# Patient Record
Sex: FEMALE | Race: WHITE | NOT HISPANIC OR LATINO | Employment: FULL TIME | URBAN - METROPOLITAN AREA
[De-identification: names, ages, dates, MRNs, and addresses within clinical notes are randomized per-mention and may not be internally consistent; named-entity substitution may affect disease eponyms.]

---

## 2017-01-12 ENCOUNTER — COMMUNICATION - HEALTHEAST (OUTPATIENT)
Dept: CARDIOLOGY | Facility: CLINIC | Age: 52
End: 2017-01-12

## 2017-01-12 DIAGNOSIS — E78.5 HYPERLIPIDEMIA: ICD-10-CM

## 2017-01-17 ENCOUNTER — COMMUNICATION - HEALTHEAST (OUTPATIENT)
Dept: CARDIOLOGY | Facility: CLINIC | Age: 52
End: 2017-01-17

## 2017-01-17 DIAGNOSIS — E78.5 HYPERLIPIDEMIA: ICD-10-CM

## 2017-02-13 ENCOUNTER — COMMUNICATION - HEALTHEAST (OUTPATIENT)
Dept: FAMILY MEDICINE | Facility: CLINIC | Age: 52
End: 2017-02-13

## 2017-02-13 DIAGNOSIS — E78.5 HYPERLIPIDEMIA: ICD-10-CM

## 2017-02-14 ENCOUNTER — COMMUNICATION - HEALTHEAST (OUTPATIENT)
Dept: CARDIOLOGY | Facility: CLINIC | Age: 52
End: 2017-02-14

## 2017-02-14 DIAGNOSIS — E78.5 HYPERLIPIDEMIA: ICD-10-CM

## 2017-03-02 ENCOUNTER — OFFICE VISIT - HEALTHEAST (OUTPATIENT)
Dept: CARDIOLOGY | Facility: CLINIC | Age: 52
End: 2017-03-02

## 2017-03-02 DIAGNOSIS — E66.3 OVERWEIGHT (BMI 25.0-29.9): ICD-10-CM

## 2017-03-02 DIAGNOSIS — E78.5 HYPERLIPIDEMIA WITH TARGET LDL LESS THAN 70: ICD-10-CM

## 2017-03-02 DIAGNOSIS — E78.5 HYPERLIPIDEMIA: ICD-10-CM

## 2017-03-02 DIAGNOSIS — I25.10 CORONARY ARTERY DISEASE INVOLVING NATIVE CORONARY ARTERY OF NATIVE HEART WITHOUT ANGINA PECTORIS: ICD-10-CM

## 2017-03-02 ASSESSMENT — MIFFLIN-ST. JEOR: SCORE: 1448.06

## 2017-04-11 ENCOUNTER — RECORDS - HEALTHEAST (OUTPATIENT)
Dept: ADMINISTRATIVE | Facility: OTHER | Age: 52
End: 2017-04-11

## 2017-07-14 ENCOUNTER — OFFICE VISIT - HEALTHEAST (OUTPATIENT)
Dept: FAMILY MEDICINE | Facility: CLINIC | Age: 52
End: 2017-07-14

## 2017-07-14 DIAGNOSIS — B37.0 THRUSH: ICD-10-CM

## 2017-07-14 DIAGNOSIS — J02.9 SORE THROAT: ICD-10-CM

## 2017-07-14 ASSESSMENT — MIFFLIN-ST. JEOR: SCORE: 1459.29

## 2017-07-19 ENCOUNTER — COMMUNICATION - HEALTHEAST (OUTPATIENT)
Dept: FAMILY MEDICINE | Facility: CLINIC | Age: 52
End: 2017-07-19

## 2017-07-28 ENCOUNTER — OFFICE VISIT - HEALTHEAST (OUTPATIENT)
Dept: FAMILY MEDICINE | Facility: CLINIC | Age: 52
End: 2017-07-28

## 2017-07-28 DIAGNOSIS — B37.0 THRUSH: ICD-10-CM

## 2017-07-28 DIAGNOSIS — E78.5 HYPERLIPIDEMIA WITH TARGET LDL LESS THAN 70: ICD-10-CM

## 2017-07-28 LAB
CHOLEST SERPL-MCNC: 151 MG/DL
FASTING STATUS PATIENT QL REPORTED: NORMAL
HBA1C MFR BLD: 5.4 % (ref 3.5–6)
HDLC SERPL-MCNC: 68 MG/DL
HIV 1+2 AB+HIV1 P24 AG SERPL QL IA: NEGATIVE
LDLC SERPL CALC-MCNC: 73 MG/DL
TRIGL SERPL-MCNC: 48 MG/DL

## 2017-07-28 ASSESSMENT — MIFFLIN-ST. JEOR: SCORE: 1451.8

## 2017-07-31 ENCOUNTER — COMMUNICATION - HEALTHEAST (OUTPATIENT)
Dept: FAMILY MEDICINE | Facility: CLINIC | Age: 52
End: 2017-07-31

## 2017-07-31 ENCOUNTER — HOSPITAL ENCOUNTER (OUTPATIENT)
Dept: MAMMOGRAPHY | Facility: CLINIC | Age: 52
Discharge: HOME | End: 2017-07-31
Attending: FAMILY MEDICINE

## 2017-07-31 DIAGNOSIS — Z12.31 VISIT FOR SCREENING MAMMOGRAM: ICD-10-CM

## 2017-08-01 ENCOUNTER — AMBULATORY - HEALTHEAST (OUTPATIENT)
Dept: FAMILY MEDICINE | Facility: CLINIC | Age: 52
End: 2017-08-01

## 2017-08-01 DIAGNOSIS — Z00.00 HEALTH CARE MAINTENANCE: ICD-10-CM

## 2017-08-01 DIAGNOSIS — D72.829 LEUKOCYTOSIS, UNSPECIFIED TYPE: ICD-10-CM

## 2017-08-04 ENCOUNTER — COMMUNICATION - HEALTHEAST (OUTPATIENT)
Dept: FAMILY MEDICINE | Facility: CLINIC | Age: 52
End: 2017-08-04

## 2017-08-16 ENCOUNTER — AMBULATORY - HEALTHEAST (OUTPATIENT)
Dept: NURSING | Facility: CLINIC | Age: 52
End: 2017-08-16

## 2017-08-16 ENCOUNTER — OFFICE VISIT - HEALTHEAST (OUTPATIENT)
Dept: OTOLARYNGOLOGY | Facility: CLINIC | Age: 52
End: 2017-08-16

## 2017-08-16 DIAGNOSIS — D72.829 LEUKOCYTOSIS, UNSPECIFIED TYPE: ICD-10-CM

## 2017-08-16 DIAGNOSIS — K14.6 TONGUE BURNING SENSATION: ICD-10-CM

## 2018-02-26 ENCOUNTER — RECORDS - HEALTHEAST (OUTPATIENT)
Dept: ADMINISTRATIVE | Facility: OTHER | Age: 53
End: 2018-02-26

## 2018-02-28 ENCOUNTER — RECORDS - HEALTHEAST (OUTPATIENT)
Dept: ADMINISTRATIVE | Facility: OTHER | Age: 53
End: 2018-02-28

## 2018-03-12 ENCOUNTER — COMMUNICATION - HEALTHEAST (OUTPATIENT)
Dept: CARDIOLOGY | Facility: CLINIC | Age: 53
End: 2018-03-12

## 2018-03-12 DIAGNOSIS — E78.5 HYPERLIPIDEMIA: ICD-10-CM

## 2018-03-13 ENCOUNTER — COMMUNICATION - HEALTHEAST (OUTPATIENT)
Dept: CARDIOLOGY | Facility: CLINIC | Age: 53
End: 2018-03-13

## 2018-03-13 DIAGNOSIS — E78.5 HYPERLIPIDEMIA: ICD-10-CM

## 2018-07-24 ENCOUNTER — OFFICE VISIT - HEALTHEAST (OUTPATIENT)
Dept: FAMILY MEDICINE | Facility: CLINIC | Age: 53
End: 2018-07-24

## 2018-07-24 DIAGNOSIS — Z00.00 ROUTINE GENERAL MEDICAL EXAMINATION AT A HEALTH CARE FACILITY: ICD-10-CM

## 2018-07-24 DIAGNOSIS — R82.998 DARK URINE: ICD-10-CM

## 2018-07-24 DIAGNOSIS — I25.10 CORONARY ARTERY DISEASE INVOLVING NATIVE CORONARY ARTERY OF NATIVE HEART WITHOUT ANGINA PECTORIS: ICD-10-CM

## 2018-07-24 DIAGNOSIS — E78.5 HYPERLIPIDEMIA WITH TARGET LDL LESS THAN 70: ICD-10-CM

## 2018-07-24 DIAGNOSIS — Z79.899 MEDICATION MANAGEMENT: ICD-10-CM

## 2018-07-24 DIAGNOSIS — E66.9 OBESITY: ICD-10-CM

## 2018-07-24 LAB
ALBUMIN SERPL-MCNC: 3.9 G/DL (ref 3.5–5)
ALBUMIN UR-MCNC: NEGATIVE MG/DL
ALP SERPL-CCNC: 62 U/L (ref 45–120)
ALT SERPL W P-5'-P-CCNC: 12 U/L (ref 0–45)
ANION GAP SERPL CALCULATED.3IONS-SCNC: 9 MMOL/L (ref 5–18)
APPEARANCE UR: CLEAR
AST SERPL W P-5'-P-CCNC: 16 U/L (ref 0–40)
BACTERIA #/AREA URNS HPF: ABNORMAL HPF
BILIRUB SERPL-MCNC: 0.6 MG/DL (ref 0–1)
BILIRUB UR QL STRIP: NEGATIVE
BUN SERPL-MCNC: 17 MG/DL (ref 8–22)
CALCIUM SERPL-MCNC: 9.2 MG/DL (ref 8.5–10.5)
CHLORIDE BLD-SCNC: 104 MMOL/L (ref 98–107)
CHOLEST SERPL-MCNC: 193 MG/DL
CO2 SERPL-SCNC: 23 MMOL/L (ref 22–31)
COLOR UR AUTO: YELLOW
CREAT SERPL-MCNC: 0.75 MG/DL (ref 0.6–1.1)
FASTING STATUS PATIENT QL REPORTED: YES
GFR SERPL CREATININE-BSD FRML MDRD: >60 ML/MIN/1.73M2
GLUCOSE BLD-MCNC: 125 MG/DL (ref 70–125)
GLUCOSE UR STRIP-MCNC: NEGATIVE MG/DL
HDLC SERPL-MCNC: 72 MG/DL
HGB BLD-MCNC: 13.3 G/DL (ref 12–16)
HGB UR QL STRIP: NEGATIVE
KETONES UR STRIP-MCNC: NEGATIVE MG/DL
LDLC SERPL CALC-MCNC: 98 MG/DL
LEUKOCYTE ESTERASE UR QL STRIP: ABNORMAL
NITRATE UR QL: NEGATIVE
PH UR STRIP: 6 [PH] (ref 5–8)
POTASSIUM BLD-SCNC: 4.7 MMOL/L (ref 3.5–5)
PROT SERPL-MCNC: 6.6 G/DL (ref 6–8)
RBC #/AREA URNS AUTO: ABNORMAL HPF
SODIUM SERPL-SCNC: 136 MMOL/L (ref 136–145)
SP GR UR STRIP: 1.01 (ref 1–1.03)
SQUAMOUS #/AREA URNS AUTO: ABNORMAL LPF
TRIGL SERPL-MCNC: 116 MG/DL
UROBILINOGEN UR STRIP-ACNC: ABNORMAL
WBC #/AREA URNS AUTO: ABNORMAL HPF

## 2018-07-24 ASSESSMENT — MIFFLIN-ST. JEOR: SCORE: 1472.21

## 2018-07-25 LAB
HPV SOURCE: NORMAL
HUMAN PAPILLOMA VIRUS 16 DNA: NEGATIVE
HUMAN PAPILLOMA VIRUS 18 DNA: NEGATIVE
HUMAN PAPILLOMA VIRUS FINAL DIAGNOSIS: NORMAL
HUMAN PAPILLOMA VIRUS OTHER HR: NEGATIVE
SPECIMEN DESCRIPTION: NORMAL

## 2018-07-27 LAB — BACTERIA SPEC CULT: NO GROWTH

## 2018-08-01 LAB
BKR LAB AP ABNORMAL BLEEDING: NO
BKR LAB AP BIRTH CONTROL/HORMONES: NORMAL
BKR LAB AP CERVICAL APPEARANCE: NORMAL
BKR LAB AP GYN ADEQUACY: NORMAL
BKR LAB AP GYN INTERPRETATION: NORMAL
BKR LAB AP HPV REFLEX: NORMAL
BKR LAB AP LMP: NORMAL
BKR LAB AP PATIENT STATUS: NORMAL
BKR LAB AP PREVIOUS ABNORMAL: NORMAL
BKR LAB AP PREVIOUS NORMAL: 2015
HIGH RISK?: NO
PATH REPORT.COMMENTS IMP SPEC: NORMAL
RESULT FLAG (HE HISTORICAL CONVERSION): NORMAL

## 2018-08-02 ENCOUNTER — RECORDS - HEALTHEAST (OUTPATIENT)
Dept: ADMINISTRATIVE | Facility: OTHER | Age: 53
End: 2018-08-02

## 2018-08-02 ENCOUNTER — HOSPITAL ENCOUNTER (OUTPATIENT)
Dept: MAMMOGRAPHY | Facility: CLINIC | Age: 53
Discharge: HOME | End: 2018-08-02
Attending: NURSE PRACTITIONER

## 2018-08-02 DIAGNOSIS — Z12.31 VISIT FOR SCREENING MAMMOGRAM: ICD-10-CM

## 2018-08-05 ENCOUNTER — COMMUNICATION - HEALTHEAST (OUTPATIENT)
Dept: FAMILY MEDICINE | Facility: CLINIC | Age: 53
End: 2018-08-05

## 2018-08-05 ENCOUNTER — AMBULATORY - HEALTHEAST (OUTPATIENT)
Dept: FAMILY MEDICINE | Facility: CLINIC | Age: 53
End: 2018-08-05

## 2018-08-05 DIAGNOSIS — R73.01 IMPAIRED FASTING GLUCOSE: ICD-10-CM

## 2018-08-24 ENCOUNTER — COMMUNICATION - HEALTHEAST (OUTPATIENT)
Dept: FAMILY MEDICINE | Facility: CLINIC | Age: 53
End: 2018-08-24

## 2018-08-29 ENCOUNTER — AMBULATORY - HEALTHEAST (OUTPATIENT)
Dept: FAMILY MEDICINE | Facility: CLINIC | Age: 53
End: 2018-08-29

## 2018-08-29 DIAGNOSIS — D72.829 LEUKOCYTOSIS, UNSPECIFIED TYPE: ICD-10-CM

## 2018-08-30 ENCOUNTER — AMBULATORY - HEALTHEAST (OUTPATIENT)
Dept: LAB | Facility: CLINIC | Age: 53
End: 2018-08-30

## 2018-08-30 ENCOUNTER — COMMUNICATION - HEALTHEAST (OUTPATIENT)
Dept: FAMILY MEDICINE | Facility: CLINIC | Age: 53
End: 2018-08-30

## 2018-08-30 DIAGNOSIS — D72.829 LEUKOCYTOSIS, UNSPECIFIED TYPE: ICD-10-CM

## 2018-08-30 DIAGNOSIS — R73.01 IMPAIRED FASTING GLUCOSE: ICD-10-CM

## 2018-08-30 LAB
ERYTHROCYTE [DISTWIDTH] IN BLOOD BY AUTOMATED COUNT: 11.9 % (ref 11–14.5)
HBA1C MFR BLD: 5.5 % (ref 3.5–6)
HCT VFR BLD AUTO: 41.6 % (ref 35–47)
HGB BLD-MCNC: 14.3 G/DL (ref 12–16)
MCH RBC QN AUTO: 33.6 PG (ref 27–34)
MCHC RBC AUTO-ENTMCNC: 34.3 G/DL (ref 32–36)
MCV RBC AUTO: 98 FL (ref 80–100)
PLATELET # BLD AUTO: 211 THOU/UL (ref 140–440)
PMV BLD AUTO: 8.7 FL (ref 7–10)
RBC # BLD AUTO: 4.25 MILL/UL (ref 3.8–5.4)
WBC: 7.3 THOU/UL (ref 4–11)

## 2018-08-31 ENCOUNTER — AMBULATORY - HEALTHEAST (OUTPATIENT)
Dept: FAMILY MEDICINE | Facility: CLINIC | Age: 53
End: 2018-08-31

## 2018-08-31 DIAGNOSIS — E78.5 HYPERLIPIDEMIA WITH TARGET LDL LESS THAN 70: ICD-10-CM

## 2018-09-17 ENCOUNTER — COMMUNICATION - HEALTHEAST (OUTPATIENT)
Dept: FAMILY MEDICINE | Facility: CLINIC | Age: 53
End: 2018-09-17

## 2018-09-25 ENCOUNTER — OFFICE VISIT - HEALTHEAST (OUTPATIENT)
Dept: FAMILY MEDICINE | Facility: CLINIC | Age: 53
End: 2018-09-25

## 2018-09-25 DIAGNOSIS — N92.1 MENORRHAGIA WITH IRREGULAR CYCLE: ICD-10-CM

## 2018-09-25 DIAGNOSIS — R14.0 ABDOMINAL BLOATING: ICD-10-CM

## 2018-09-25 DIAGNOSIS — K59.00 CONSTIPATION, UNSPECIFIED CONSTIPATION TYPE: ICD-10-CM

## 2018-09-25 ASSESSMENT — MIFFLIN-ST. JEOR: SCORE: 1467.68

## 2018-09-26 ENCOUNTER — HOSPITAL ENCOUNTER (OUTPATIENT)
Dept: ULTRASOUND IMAGING | Facility: CLINIC | Age: 53
Discharge: HOME | End: 2018-09-26
Attending: NURSE PRACTITIONER

## 2018-09-26 DIAGNOSIS — R14.0 ABDOMINAL BLOATING: ICD-10-CM

## 2018-09-26 DIAGNOSIS — N92.1 MENORRHAGIA WITH IRREGULAR CYCLE: ICD-10-CM

## 2018-09-27 ENCOUNTER — COMMUNICATION - HEALTHEAST (OUTPATIENT)
Dept: FAMILY MEDICINE | Facility: CLINIC | Age: 53
End: 2018-09-27

## 2018-10-17 ENCOUNTER — COMMUNICATION - HEALTHEAST (OUTPATIENT)
Dept: FAMILY MEDICINE | Facility: CLINIC | Age: 53
End: 2018-10-17

## 2018-11-29 ENCOUNTER — COMMUNICATION - HEALTHEAST (OUTPATIENT)
Dept: FAMILY MEDICINE | Facility: CLINIC | Age: 53
End: 2018-11-29

## 2018-11-29 DIAGNOSIS — E78.5 HYPERLIPIDEMIA LDL GOAL <70: ICD-10-CM

## 2019-01-16 ENCOUNTER — COMMUNICATION - HEALTHEAST (OUTPATIENT)
Dept: ADMINISTRATIVE | Facility: CLINIC | Age: 54
End: 2019-01-16

## 2019-01-30 ENCOUNTER — RECORDS - HEALTHEAST (OUTPATIENT)
Dept: ADMINISTRATIVE | Facility: OTHER | Age: 54
End: 2019-01-30

## 2019-02-28 ENCOUNTER — RECORDS - HEALTHEAST (OUTPATIENT)
Dept: ADMINISTRATIVE | Facility: OTHER | Age: 54
End: 2019-02-28

## 2019-04-05 ENCOUNTER — AMBULATORY - HEALTHEAST (OUTPATIENT)
Dept: LAB | Facility: CLINIC | Age: 54
End: 2019-04-05

## 2019-04-05 DIAGNOSIS — E78.5 HYPERLIPIDEMIA WITH TARGET LDL LESS THAN 70: ICD-10-CM

## 2019-04-05 LAB
CHOLEST SERPL-MCNC: 155 MG/DL
FASTING STATUS PATIENT QL REPORTED: YES
HDLC SERPL-MCNC: 79 MG/DL
LDLC SERPL CALC-MCNC: 63 MG/DL
TRIGL SERPL-MCNC: 66 MG/DL

## 2019-04-07 ENCOUNTER — COMMUNICATION - HEALTHEAST (OUTPATIENT)
Dept: FAMILY MEDICINE | Facility: CLINIC | Age: 54
End: 2019-04-07

## 2019-04-16 ENCOUNTER — OFFICE VISIT - HEALTHEAST (OUTPATIENT)
Dept: FAMILY MEDICINE | Facility: CLINIC | Age: 54
End: 2019-04-16

## 2019-04-16 ENCOUNTER — AMBULATORY - HEALTHEAST (OUTPATIENT)
Dept: FAMILY MEDICINE | Facility: CLINIC | Age: 54
End: 2019-04-16

## 2019-04-16 ENCOUNTER — COMMUNICATION - HEALTHEAST (OUTPATIENT)
Dept: FAMILY MEDICINE | Facility: CLINIC | Age: 54
End: 2019-04-16

## 2019-04-16 DIAGNOSIS — R19.5 ABNORMAL STOOL COLOR: ICD-10-CM

## 2019-04-16 DIAGNOSIS — R10.84 ABDOMINAL PAIN, GENERALIZED: ICD-10-CM

## 2019-04-16 DIAGNOSIS — I73.00 RAYNAUD'S DISEASE WITHOUT GANGRENE: ICD-10-CM

## 2019-04-16 LAB
ALBUMIN SERPL-MCNC: 4.2 G/DL (ref 3.5–5)
ALBUMIN UR-MCNC: NEGATIVE MG/DL
ALP SERPL-CCNC: 71 U/L (ref 45–120)
ALT SERPL W P-5'-P-CCNC: 15 U/L (ref 0–45)
ANION GAP SERPL CALCULATED.3IONS-SCNC: 10 MMOL/L (ref 5–18)
APPEARANCE UR: CLEAR
AST SERPL W P-5'-P-CCNC: 19 U/L (ref 0–40)
BILIRUB SERPL-MCNC: 0.5 MG/DL (ref 0–1)
BILIRUB UR QL STRIP: NEGATIVE
BUN SERPL-MCNC: 13 MG/DL (ref 8–22)
CALCIUM SERPL-MCNC: 9.7 MG/DL (ref 8.5–10.5)
CHLORIDE BLD-SCNC: 101 MMOL/L (ref 98–107)
CO2 SERPL-SCNC: 26 MMOL/L (ref 22–31)
COLOR UR AUTO: YELLOW
CREAT SERPL-MCNC: 0.73 MG/DL (ref 0.6–1.1)
ERYTHROCYTE [DISTWIDTH] IN BLOOD BY AUTOMATED COUNT: 11.7 % (ref 11–14.5)
GFR SERPL CREATININE-BSD FRML MDRD: >60 ML/MIN/1.73M2
GLUCOSE BLD-MCNC: 85 MG/DL (ref 70–125)
GLUCOSE UR STRIP-MCNC: NEGATIVE MG/DL
HCT VFR BLD AUTO: 41.7 % (ref 35–47)
HGB BLD-MCNC: 13.9 G/DL (ref 12–16)
HGB UR QL STRIP: NEGATIVE
KETONES UR STRIP-MCNC: NEGATIVE MG/DL
LEUKOCYTE ESTERASE UR QL STRIP: NEGATIVE
LIPASE SERPL-CCNC: 43 U/L (ref 0–52)
MCH RBC QN AUTO: 33.3 PG (ref 27–34)
MCHC RBC AUTO-ENTMCNC: 33.4 G/DL (ref 32–36)
MCV RBC AUTO: 100 FL (ref 80–100)
NITRATE UR QL: NEGATIVE
PH UR STRIP: 6 [PH] (ref 5–8)
PLATELET # BLD AUTO: 258 THOU/UL (ref 140–440)
PMV BLD AUTO: 8.6 FL (ref 7–10)
POTASSIUM BLD-SCNC: 4.9 MMOL/L (ref 3.5–5)
PROT SERPL-MCNC: 7 G/DL (ref 6–8)
RBC # BLD AUTO: 4.19 MILL/UL (ref 3.8–5.4)
SODIUM SERPL-SCNC: 137 MMOL/L (ref 136–145)
SP GR UR STRIP: <=1.005 (ref 1–1.03)
UROBILINOGEN UR STRIP-ACNC: NORMAL
WBC: 8.1 THOU/UL (ref 4–11)

## 2019-04-16 ASSESSMENT — MIFFLIN-ST. JEOR: SCORE: 1447.72

## 2019-04-17 ENCOUNTER — COMMUNICATION - HEALTHEAST (OUTPATIENT)
Dept: FAMILY MEDICINE | Facility: CLINIC | Age: 54
End: 2019-04-17

## 2019-04-18 ENCOUNTER — AMBULATORY - HEALTHEAST (OUTPATIENT)
Dept: FAMILY MEDICINE | Facility: CLINIC | Age: 54
End: 2019-04-18

## 2019-04-18 ENCOUNTER — HOSPITAL ENCOUNTER (OUTPATIENT)
Dept: ULTRASOUND IMAGING | Facility: CLINIC | Age: 54
Discharge: HOME | End: 2019-04-18
Attending: NURSE PRACTITIONER

## 2019-04-18 ENCOUNTER — COMMUNICATION - HEALTHEAST (OUTPATIENT)
Dept: FAMILY MEDICINE | Facility: CLINIC | Age: 54
End: 2019-04-18

## 2019-04-18 DIAGNOSIS — R10.84 ABDOMINAL PAIN, GENERALIZED: ICD-10-CM

## 2019-04-18 DIAGNOSIS — R19.5 ABNORMAL STOOL COLOR: ICD-10-CM

## 2019-04-29 ENCOUNTER — RECORDS - HEALTHEAST (OUTPATIENT)
Dept: ADMINISTRATIVE | Facility: OTHER | Age: 54
End: 2019-04-29

## 2019-05-02 ENCOUNTER — RECORDS - HEALTHEAST (OUTPATIENT)
Dept: ADMINISTRATIVE | Facility: OTHER | Age: 54
End: 2019-05-02

## 2019-05-02 ENCOUNTER — HOSPITAL ENCOUNTER (OUTPATIENT)
Dept: RADIOLOGY | Facility: CLINIC | Age: 54
Discharge: HOME | End: 2019-05-02

## 2019-05-02 DIAGNOSIS — K59.00 CONSTIPATION, UNSPECIFIED CONSTIPATION TYPE: ICD-10-CM

## 2019-06-03 ENCOUNTER — RECORDS - HEALTHEAST (OUTPATIENT)
Dept: ADMINISTRATIVE | Facility: OTHER | Age: 54
End: 2019-06-03

## 2019-09-30 ENCOUNTER — OFFICE VISIT - HEALTHEAST (OUTPATIENT)
Dept: CARDIOLOGY | Facility: CLINIC | Age: 54
End: 2019-09-30

## 2019-09-30 ENCOUNTER — COMMUNICATION - HEALTHEAST (OUTPATIENT)
Dept: CARDIOLOGY | Facility: CLINIC | Age: 54
End: 2019-09-30

## 2019-09-30 DIAGNOSIS — E78.5 HYPERLIPIDEMIA WITH TARGET LDL LESS THAN 70: ICD-10-CM

## 2019-09-30 DIAGNOSIS — I25.10 CORONARY ARTERY DISEASE INVOLVING NATIVE CORONARY ARTERY OF NATIVE HEART WITHOUT ANGINA PECTORIS: ICD-10-CM

## 2019-09-30 DIAGNOSIS — E66.3 OVERWEIGHT (BMI 25.0-29.9): ICD-10-CM

## 2019-09-30 DIAGNOSIS — E78.5 HYPERLIPIDEMIA LDL GOAL <70: ICD-10-CM

## 2019-09-30 ASSESSMENT — MIFFLIN-ST. JEOR: SCORE: 1476.52

## 2019-10-07 ENCOUNTER — HOSPITAL ENCOUNTER (OUTPATIENT)
Dept: MAMMOGRAPHY | Facility: CLINIC | Age: 54
Discharge: HOME | End: 2019-10-07
Attending: NURSE PRACTITIONER

## 2019-10-07 DIAGNOSIS — Z12.31 VISIT FOR SCREENING MAMMOGRAM: ICD-10-CM

## 2019-12-09 ENCOUNTER — COMMUNICATION - HEALTHEAST (OUTPATIENT)
Dept: FAMILY MEDICINE | Facility: CLINIC | Age: 54
End: 2019-12-09

## 2019-12-09 DIAGNOSIS — E78.5 HYPERLIPIDEMIA LDL GOAL <70: ICD-10-CM

## 2019-12-20 ENCOUNTER — RECORDS - HEALTHEAST (OUTPATIENT)
Dept: ADMINISTRATIVE | Facility: OTHER | Age: 54
End: 2019-12-20

## 2020-02-26 ENCOUNTER — RECORDS - HEALTHEAST (OUTPATIENT)
Dept: ADMINISTRATIVE | Facility: OTHER | Age: 55
End: 2020-02-26

## 2020-03-04 ENCOUNTER — COMMUNICATION - HEALTHEAST (OUTPATIENT)
Dept: FAMILY MEDICINE | Facility: CLINIC | Age: 55
End: 2020-03-04

## 2020-03-04 DIAGNOSIS — E78.5 HYPERLIPIDEMIA LDL GOAL <70: ICD-10-CM

## 2020-04-06 ENCOUNTER — OFFICE VISIT - HEALTHEAST (OUTPATIENT)
Dept: FAMILY MEDICINE | Facility: CLINIC | Age: 55
End: 2020-04-06

## 2020-04-06 DIAGNOSIS — H00.015 HORDEOLUM EXTERNUM OF LEFT LOWER EYELID: ICD-10-CM

## 2020-05-19 ENCOUNTER — OFFICE VISIT - HEALTHEAST (OUTPATIENT)
Dept: FAMILY MEDICINE | Facility: CLINIC | Age: 55
End: 2020-05-19

## 2020-05-19 DIAGNOSIS — M25.50 POLYARTHRALGIA: ICD-10-CM

## 2020-05-19 DIAGNOSIS — E78.5 HYPERLIPIDEMIA WITH TARGET LDL LESS THAN 70: ICD-10-CM

## 2020-05-19 DIAGNOSIS — Z79.899 MEDICATION MANAGEMENT: ICD-10-CM

## 2020-05-19 DIAGNOSIS — R59.1 LYMPHADENOPATHY: ICD-10-CM

## 2020-05-19 DIAGNOSIS — I25.10 CORONARY ARTERY DISEASE INVOLVING NATIVE CORONARY ARTERY OF NATIVE HEART WITHOUT ANGINA PECTORIS: ICD-10-CM

## 2020-05-20 ENCOUNTER — AMBULATORY - HEALTHEAST (OUTPATIENT)
Dept: LAB | Facility: CLINIC | Age: 55
End: 2020-05-20

## 2020-05-20 DIAGNOSIS — E78.5 HYPERLIPIDEMIA WITH TARGET LDL LESS THAN 70: ICD-10-CM

## 2020-05-20 DIAGNOSIS — R59.1 LYMPHADENOPATHY: ICD-10-CM

## 2020-05-20 DIAGNOSIS — M25.50 POLYARTHRALGIA: ICD-10-CM

## 2020-05-20 DIAGNOSIS — Z79.899 MEDICATION MANAGEMENT: ICD-10-CM

## 2020-05-20 LAB
ALBUMIN SERPL-MCNC: 4.3 G/DL (ref 3.5–5)
ALP SERPL-CCNC: 87 U/L (ref 45–120)
ALT SERPL W P-5'-P-CCNC: 16 U/L (ref 0–45)
ANION GAP SERPL CALCULATED.3IONS-SCNC: 12 MMOL/L (ref 5–18)
AST SERPL W P-5'-P-CCNC: 25 U/L (ref 0–40)
BILIRUB SERPL-MCNC: 0.6 MG/DL (ref 0–1)
BUN SERPL-MCNC: 12 MG/DL (ref 8–22)
CALCIUM SERPL-MCNC: 9.7 MG/DL (ref 8.5–10.5)
CHLORIDE BLD-SCNC: 102 MMOL/L (ref 98–107)
CHOLEST SERPL-MCNC: 168 MG/DL
CO2 SERPL-SCNC: 24 MMOL/L (ref 22–31)
CREAT SERPL-MCNC: 0.74 MG/DL (ref 0.6–1.1)
ERYTHROCYTE [DISTWIDTH] IN BLOOD BY AUTOMATED COUNT: 11.7 % (ref 11–14.5)
ERYTHROCYTE [SEDIMENTATION RATE] IN BLOOD BY WESTERGREN METHOD: 3 MM/HR (ref 0–20)
FASTING STATUS PATIENT QL REPORTED: YES
GFR SERPL CREATININE-BSD FRML MDRD: >60 ML/MIN/1.73M2
GLUCOSE BLD-MCNC: 95 MG/DL (ref 70–125)
HCT VFR BLD AUTO: 42.4 % (ref 35–47)
HDLC SERPL-MCNC: 82 MG/DL
HGB BLD-MCNC: 14.5 G/DL (ref 12–16)
LDLC SERPL CALC-MCNC: 75 MG/DL
MCH RBC QN AUTO: 33.6 PG (ref 27–34)
MCHC RBC AUTO-ENTMCNC: 34.2 G/DL (ref 32–36)
MCV RBC AUTO: 98 FL (ref 80–100)
PLATELET # BLD AUTO: 177 THOU/UL (ref 140–440)
PMV BLD AUTO: 9.3 FL (ref 7–10)
POTASSIUM BLD-SCNC: 4.8 MMOL/L (ref 3.5–5)
PROT SERPL-MCNC: 7 G/DL (ref 6–8)
RBC # BLD AUTO: 4.32 MILL/UL (ref 3.8–5.4)
RHEUMATOID FACT SERPL-ACNC: <15 IU/ML (ref 0–30)
SODIUM SERPL-SCNC: 138 MMOL/L (ref 136–145)
TRIGL SERPL-MCNC: 57 MG/DL
WBC: 8.4 THOU/UL (ref 4–11)

## 2020-05-21 ENCOUNTER — HOSPITAL ENCOUNTER (OUTPATIENT)
Dept: ULTRASOUND IMAGING | Facility: CLINIC | Age: 55
Discharge: HOME | End: 2020-05-21
Attending: NURSE PRACTITIONER

## 2020-05-21 DIAGNOSIS — R59.1 LYMPHADENOPATHY: ICD-10-CM

## 2020-05-22 ENCOUNTER — AMBULATORY - HEALTHEAST (OUTPATIENT)
Dept: FAMILY MEDICINE | Facility: CLINIC | Age: 55
End: 2020-05-22

## 2020-05-22 DIAGNOSIS — E55.9 VITAMIN D DEFICIENCY: ICD-10-CM

## 2020-05-22 LAB
25(OH)D3 SERPL-MCNC: 15.8 NG/ML (ref 30–80)
25(OH)D3 SERPL-MCNC: 15.8 NG/ML (ref 30–80)
ANA SER QL: 0.1 U
B BURGDOR IGG+IGM SER QL: <0.01 INDEX VALUE

## 2020-06-02 ENCOUNTER — COMMUNICATION - HEALTHEAST (OUTPATIENT)
Dept: FAMILY MEDICINE | Facility: CLINIC | Age: 55
End: 2020-06-02

## 2020-06-02 DIAGNOSIS — E78.5 HYPERLIPIDEMIA LDL GOAL <70: ICD-10-CM

## 2020-07-08 ENCOUNTER — OFFICE VISIT - HEALTHEAST (OUTPATIENT)
Dept: RHEUMATOLOGY | Facility: CLINIC | Age: 55
End: 2020-07-08

## 2020-07-08 DIAGNOSIS — M79.604 LEG PAIN, BILATERAL: ICD-10-CM

## 2020-07-08 DIAGNOSIS — I73.00 RAYNAUD'S DISEASE WITHOUT GANGRENE: ICD-10-CM

## 2020-07-08 DIAGNOSIS — M79.605 LEG PAIN, BILATERAL: ICD-10-CM

## 2020-07-08 DIAGNOSIS — M79.10 MYALGIA: ICD-10-CM

## 2020-07-08 DIAGNOSIS — E55.9 VITAMIN D DEFICIENCY: ICD-10-CM

## 2020-07-08 DIAGNOSIS — Z78.9 ALCOHOL CONSUMPTION TWO TO FOUR DAYS PER WEEK: ICD-10-CM

## 2020-07-09 ENCOUNTER — OFFICE VISIT - HEALTHEAST (OUTPATIENT)
Dept: FAMILY MEDICINE | Facility: CLINIC | Age: 55
End: 2020-07-09

## 2020-07-09 DIAGNOSIS — E66.09 CLASS 1 OBESITY DUE TO EXCESS CALORIES WITH SERIOUS COMORBIDITY AND BODY MASS INDEX (BMI) OF 30.0 TO 30.9 IN ADULT: ICD-10-CM

## 2020-07-09 DIAGNOSIS — Z00.00 ROUTINE GENERAL MEDICAL EXAMINATION AT A HEALTH CARE FACILITY: ICD-10-CM

## 2020-07-09 DIAGNOSIS — E78.5 HYPERLIPIDEMIA WITH TARGET LDL LESS THAN 70: ICD-10-CM

## 2020-07-09 DIAGNOSIS — E55.9 VITAMIN D DEFICIENCY: ICD-10-CM

## 2020-07-09 DIAGNOSIS — E66.811 CLASS 1 OBESITY DUE TO EXCESS CALORIES WITH SERIOUS COMORBIDITY AND BODY MASS INDEX (BMI) OF 30.0 TO 30.9 IN ADULT: ICD-10-CM

## 2020-07-09 DIAGNOSIS — I25.10 CORONARY ARTERY DISEASE INVOLVING NATIVE CORONARY ARTERY OF NATIVE HEART WITHOUT ANGINA PECTORIS: ICD-10-CM

## 2020-07-09 ASSESSMENT — MIFFLIN-ST. JEOR: SCORE: 1464.05

## 2020-07-24 ENCOUNTER — AMBULATORY - HEALTHEAST (OUTPATIENT)
Dept: LAB | Facility: CLINIC | Age: 55
End: 2020-07-24

## 2020-07-24 DIAGNOSIS — E55.9 VITAMIN D DEFICIENCY: ICD-10-CM

## 2020-07-24 DIAGNOSIS — M79.605 LEG PAIN, BILATERAL: ICD-10-CM

## 2020-07-24 DIAGNOSIS — I73.00 RAYNAUD'S DISEASE WITHOUT GANGRENE: ICD-10-CM

## 2020-07-24 DIAGNOSIS — Z78.9 ALCOHOL CONSUMPTION TWO TO FOUR DAYS PER WEEK: ICD-10-CM

## 2020-07-24 DIAGNOSIS — M79.10 MYALGIA: ICD-10-CM

## 2020-07-24 DIAGNOSIS — M79.604 LEG PAIN, BILATERAL: ICD-10-CM

## 2020-07-24 LAB
25(OH)D3 SERPL-MCNC: 54.7 NG/ML (ref 30–80)
25(OH)D3 SERPL-MCNC: 54.7 NG/ML (ref 30–80)
C REACTIVE PROTEIN LHE: 0.1 MG/DL (ref 0–0.8)
CK SERPL-CCNC: 88 U/L (ref 30–190)
MAGNESIUM SERPL-MCNC: 2.3 MG/DL (ref 1.8–2.6)
TSH SERPL DL<=0.005 MIU/L-ACNC: 3.55 UIU/ML (ref 0.3–5)
URATE SERPL-MCNC: 5.5 MG/DL (ref 2–7.5)

## 2020-07-25 LAB — ANCA IGG TITR SER IF: NORMAL {TITER}

## 2020-07-29 LAB
ALBUMIN PERCENT: 65.2 % (ref 51–67)
ALBUMIN SERPL ELPH-MCNC: 4.4 G/DL (ref 3.2–4.7)
ALPHA 1 PERCENT: 2.4 % (ref 2–4)
ALPHA 2 PERCENT: 10.9 % (ref 5–13)
ALPHA1 GLOB SERPL ELPH-MCNC: 0.2 G/DL (ref 0.1–0.3)
ALPHA2 GLOB SERPL ELPH-MCNC: 0.7 G/DL (ref 0.4–0.9)
B-GLOBULIN SERPL ELPH-MCNC: 0.8 G/DL (ref 0.7–1.2)
BETA PERCENT: 12.3 % (ref 10–17)
GAMMA GLOB SERPL ELPH-MCNC: 0.6 G/DL (ref 0.6–1.4)
GAMMA GLOBULIN PERCENT: 9.2 % (ref 9–20)
PATH ICD:: NORMAL
PATH ICD:: NORMAL
PROT PATTERN SERPL ELPH-IMP: NORMAL
PROT PATTERN SERPL ELPH-IMP: NORMAL
PROT SERPL-MCNC: 6.7 G/DL (ref 6–8)
REVIEWING PATHOLOGIST: NORMAL
REVIEWING PATHOLOGIST: NORMAL
TOTAL PROTEIN RANDOM URINE MG/DL: <7 MG/DL

## 2020-07-31 LAB — CRYOGLOB SER QL: NEGATIVE %

## 2020-10-28 ENCOUNTER — OFFICE VISIT - HEALTHEAST (OUTPATIENT)
Dept: PODIATRY | Facility: CLINIC | Age: 55
End: 2020-10-28

## 2020-10-28 DIAGNOSIS — M72.2 PLANTAR FASCIITIS: ICD-10-CM

## 2020-10-28 ASSESSMENT — MIFFLIN-ST. JEOR: SCORE: 1464.05

## 2020-11-08 ENCOUNTER — COMMUNICATION - HEALTHEAST (OUTPATIENT)
Dept: PODIATRY | Facility: CLINIC | Age: 55
End: 2020-11-08

## 2020-11-08 DIAGNOSIS — M72.2 PLANTAR FASCIITIS: ICD-10-CM

## 2020-11-09 RX ORDER — IBUPROFEN 600 MG/1
TABLET, FILM COATED ORAL
Qty: 42 TABLET | Refills: 0 | Status: SHIPPED | OUTPATIENT
Start: 2020-11-09 | End: 2022-08-05

## 2020-11-26 ENCOUNTER — COMMUNICATION - HEALTHEAST (OUTPATIENT)
Dept: PODIATRY | Facility: CLINIC | Age: 55
End: 2020-11-26

## 2020-11-26 DIAGNOSIS — M72.2 PLANTAR FASCIITIS: ICD-10-CM

## 2020-12-08 ENCOUNTER — HOSPITAL ENCOUNTER (OUTPATIENT)
Dept: MAMMOGRAPHY | Facility: CLINIC | Age: 55
Discharge: HOME | End: 2020-12-08
Attending: NURSE PRACTITIONER

## 2020-12-08 DIAGNOSIS — Z12.31 VISIT FOR SCREENING MAMMOGRAM: ICD-10-CM

## 2021-01-06 ENCOUNTER — RECORDS - HEALTHEAST (OUTPATIENT)
Dept: ADMINISTRATIVE | Facility: OTHER | Age: 56
End: 2021-01-06

## 2021-01-14 ENCOUNTER — OFFICE VISIT - HEALTHEAST (OUTPATIENT)
Dept: FAMILY MEDICINE | Facility: CLINIC | Age: 56
End: 2021-01-14

## 2021-01-14 DIAGNOSIS — N30.01 ACUTE CYSTITIS WITH HEMATURIA: ICD-10-CM

## 2021-01-14 DIAGNOSIS — R30.0 DYSURIA: ICD-10-CM

## 2021-01-14 DIAGNOSIS — R20.2 PARESTHESIA: ICD-10-CM

## 2021-01-14 DIAGNOSIS — M79.641 BILATERAL HAND PAIN: ICD-10-CM

## 2021-01-14 DIAGNOSIS — M25.50 POLYARTHRALGIA: ICD-10-CM

## 2021-01-14 DIAGNOSIS — M79.642 BILATERAL HAND PAIN: ICD-10-CM

## 2021-01-14 LAB
ALBUMIN UR-MCNC: NEGATIVE MG/DL
APPEARANCE UR: ABNORMAL
BACTERIA #/AREA URNS HPF: ABNORMAL HPF
BILIRUB UR QL STRIP: NEGATIVE
CLUE CELLS: NORMAL
COLOR UR AUTO: YELLOW
GLUCOSE UR STRIP-MCNC: NEGATIVE MG/DL
HGB UR QL STRIP: ABNORMAL
KETONES UR STRIP-MCNC: NEGATIVE MG/DL
LEUKOCYTE ESTERASE UR QL STRIP: ABNORMAL
NITRATE UR QL: NEGATIVE
PH UR STRIP: 5.5 [PH] (ref 5–8)
RBC #/AREA URNS AUTO: ABNORMAL HPF
SP GR UR STRIP: 1.01 (ref 1–1.03)
SQUAMOUS #/AREA URNS AUTO: ABNORMAL LPF
TRICHOMONAS, WET PREP: NORMAL
UROBILINOGEN UR STRIP-ACNC: ABNORMAL
WBC #/AREA URNS AUTO: ABNORMAL HPF
WBC CLUMPS #/AREA URNS HPF: PRESENT /[HPF]
YEAST, WET PREP: NORMAL

## 2021-01-16 LAB — BACTERIA SPEC CULT: ABNORMAL

## 2021-01-17 ENCOUNTER — AMBULATORY - HEALTHEAST (OUTPATIENT)
Dept: FAMILY MEDICINE | Facility: CLINIC | Age: 56
End: 2021-01-17

## 2021-01-18 ENCOUNTER — AMBULATORY - HEALTHEAST (OUTPATIENT)
Dept: FAMILY MEDICINE | Facility: CLINIC | Age: 56
End: 2021-01-18

## 2021-01-18 DIAGNOSIS — N30.01 ACUTE CYSTITIS WITH HEMATURIA: ICD-10-CM

## 2021-02-02 ENCOUNTER — COMMUNICATION - HEALTHEAST (OUTPATIENT)
Dept: CARDIOLOGY | Facility: CLINIC | Age: 56
End: 2021-02-02

## 2021-03-02 ENCOUNTER — RECORDS - HEALTHEAST (OUTPATIENT)
Dept: ADMINISTRATIVE | Facility: OTHER | Age: 56
End: 2021-03-02

## 2021-05-15 ENCOUNTER — COMMUNICATION - HEALTHEAST (OUTPATIENT)
Dept: FAMILY MEDICINE | Facility: CLINIC | Age: 56
End: 2021-05-15

## 2021-05-15 DIAGNOSIS — E78.5 HYPERLIPIDEMIA LDL GOAL <70: ICD-10-CM

## 2021-05-15 RX ORDER — PRAVASTATIN SODIUM 80 MG/1
TABLET ORAL
Qty: 90 TABLET | Refills: 2 | Status: SHIPPED | OUTPATIENT
Start: 2021-05-15 | End: 2021-11-23

## 2021-05-27 NOTE — TELEPHONE ENCOUNTER
Reason contacted:  Results  Information relayed:  See message  Additional questions:  No  Further follow-up needed:  No  Okay to leave a detailed message:  No

## 2021-05-27 NOTE — TELEPHONE ENCOUNTER
Upcoming Appointment Question  When is the appointment: Today  What is your appointment for?: light colored stool, constant pain in mid section  Who is your appointment scheduled with?: PCP only  What is your question/concern?: Patient would like to come in earlier today if there is a cancellation as she plans to fast for visit.  -declined nurse triage  Okay to leave a detailed message?: Yes

## 2021-05-27 NOTE — PROGRESS NOTES
Assessment and Plan:     1. Abdominal pain, generalized  HM2(CBC w/o Differential)    Comprehensive Metabolic Panel    Lipase    Urinalysis-UC if Indicated   2. Abnormal stool color     3. Raynaud's disease without gangrene       Will check hemogram, CMP, lipase, urinalysis due to different locations of her abdominal discomfort.  Due to the white stool, we will rule out gallbladder disease, pancreatitis, pancreatic cancer, hepatitis.  Antacid consumption may be contributing.  Will order abdominal ultrasound or CT based off the results of the labs.  Discussed referral to rheumatology due to history of Raynaud's and recent dry mouth, but she declines.  She would like to evaluate her current symptoms first.  She will notify me when she wants to see rheumatology.  She is content with the plan.    Subjective:     Tali is a 53 y.o. female presenting to the clinic for multiple concerns today.  Patient states in March 2018, she developed midepigastric abdominal pain.  She had difficulty turning her body due to the pain.  She saw a chiropractor who performed a deep tissue massage.  Since then, she has had intermittent tightness within her upper abdomen and thoracic back region.  She has found no correlation of symptoms with food consumption.  No heartburn has been present.  She does have a history of constipation.  One week ago Saturday, she drank coffee.  Shortly after, she developed chills and dry mouth.  She has some abdominal discomfort and made herself vomit.  She had lack of appetite the rest the day.  This past Sunday, she noticed that her stool was white in color.  It was formed.  She denies any blood or mucus.  Patient states she has had a white stool every day since.  She has been taking some over-the-counter antacids.  She takes Krill and co-Q10 regularly.  She has been consuming more white cheese.  She is concerned as her father had pancreatic cancer and her mother had stomach cancer.  Patient is also concerned  of possible autoimmune disease.  She was diagnosed with Raynaud's multiple years ago.  Her paternal grandfather and father had Parkinson's and a cousin had MS.      Review of Systems: A complete 14 point review of systems was obtained and is negative or as stated in the history of present illness.    Social History     Socioeconomic History     Marital status:      Spouse name: Not on file     Number of children: Not on file     Years of education: Not on file     Highest education level: Not on file   Occupational History     Not on file   Social Needs     Financial resource strain: Not on file     Food insecurity:     Worry: Not on file     Inability: Not on file     Transportation needs:     Medical: Not on file     Non-medical: Not on file   Tobacco Use     Smoking status: Never Smoker     Smokeless tobacco: Never Used   Substance and Sexual Activity     Alcohol use: Yes     Alcohol/week: 6.0 oz     Types: 5 Glasses of wine, 5 Cans of beer per week     Drug use: No     Sexual activity: Not on file     Comment:     Lifestyle     Physical activity:     Days per week: Not on file     Minutes per session: Not on file     Stress: Not on file   Relationships     Social connections:     Talks on phone: Not on file     Gets together: Not on file     Attends Gnosticism service: Not on file     Active member of club or organization: Not on file     Attends meetings of clubs or organizations: Not on file     Relationship status: Not on file     Intimate partner violence:     Fear of current or ex partner: Not on file     Emotionally abused: Not on file     Physically abused: Not on file     Forced sexual activity: Not on file   Other Topics Concern     Not on file   Social History Narrative     Not on file       Active Ambulatory Problems     Diagnosis Date Noted     Vitamin D Deficiency      Limb Pain      Rosacea      Vertigo      Basal Cell Carcinoma Of The Skin      CAD (coronary artery disease), native  "coronary artery 10/29/2015     Hyperlipidemia LDL goal < 70 10/29/2015     Colon polyp 05/18/2016     Overweight (BMI 25.0-29.9) 03/02/2017     Resolved Ambulatory Problems     Diagnosis Date Noted     Obesity      Past Medical History:   Diagnosis Date     Coronary artery calcification seen on CT scan      Hyperlipidemia      Overweight (BMI 25.0-29.9)        Family History   Problem Relation Age of Onset     Colon cancer Maternal Grandfather 65     Macular degeneration Maternal Grandfather      Stomach cancer Mother 59     Coronary artery disease Father      Parkinsonism Father      Pancreatic cancer Father 59     Stroke Maternal Grandmother      Parkinsonism Paternal Grandfather      Heart attack Paternal Uncle      ALS Paternal Aunt      Multiple sclerosis Other 35     Heart disease Neg Hx        Objective:     /80   Pulse 76   Ht 5' 6\" (1.676 m)   Wt 185 lb 6.4 oz (84.1 kg)   SpO2 100%   BMI 29.92 kg/m      Patient is alert, in no obvious distress.   Skin: Warm, dry.  No lesions or rashes.  Skin turgor rapid return.   HEENT:  Head normocephalic, atraumatic.  Eyes normal. Ears normal.  Nose patent, mucosa pink.  Oropharynx mucosa pink.  No lesions or tonsillar enlargement.   Neck: Supple, no lymphadenopathy.   Lungs:  Clear to auscultation. Respirations even and unlabored.  No wheezing or rales noted.   Heart:  Regular rate and rhythm.  No murmurs, S3, S4, gallops, or rubs.    Abdomen: Soft, nontender.  No organomegaly. Bowel sounds normoactive. No guarding or masses noted.                 "

## 2021-05-27 NOTE — TELEPHONE ENCOUNTER
----- Message from Juliette Gage CMA sent at 4/18/2019  1:23 PM CDT -----      ----- Message -----  From: Cherry Mccauley CNP  Sent: 4/18/2019  12:07 PM  To: Cherry Mccauley Care Team Pool    Please notify the patient that her abdominal ultrasound is normal.  I recommend avoiding antiacids.  I placed a referral to gastroenterology for further evaluation if her symptoms persist.  Thanks.

## 2021-05-27 NOTE — TELEPHONE ENCOUNTER
----- Message from Juliette Gage CMA sent at 4/17/2019  8:13 AM CDT -----      ----- Message -----  From: Cherry Mccauley CNP  Sent: 4/16/2019   8:51 PM  To: Cherry Mccauley Care Team Pool    Please notify the patient that her labs are normal.  I placed an order for an ultrasound for further evaluation of her abdominal pain.  Thanks.

## 2021-05-28 ENCOUNTER — RECORDS - HEALTHEAST (OUTPATIENT)
Dept: ADMINISTRATIVE | Facility: CLINIC | Age: 56
End: 2021-05-28

## 2021-05-29 ENCOUNTER — RECORDS - HEALTHEAST (OUTPATIENT)
Dept: ADMINISTRATIVE | Facility: CLINIC | Age: 56
End: 2021-05-29

## 2021-05-30 ENCOUNTER — RECORDS - HEALTHEAST (OUTPATIENT)
Dept: ADMINISTRATIVE | Facility: CLINIC | Age: 56
End: 2021-05-30

## 2021-05-30 VITALS — WEIGHT: 184.6 LBS | HEIGHT: 66 IN | BODY MASS INDEX: 29.67 KG/M2

## 2021-05-31 VITALS — BODY MASS INDEX: 29.22 KG/M2 | HEIGHT: 67 IN | WEIGHT: 186.2 LBS

## 2021-05-31 VITALS — BODY MASS INDEX: 29.94 KG/M2 | HEIGHT: 66 IN | WEIGHT: 186.3 LBS

## 2021-06-01 ENCOUNTER — RECORDS - HEALTHEAST (OUTPATIENT)
Dept: ADMINISTRATIVE | Facility: CLINIC | Age: 56
End: 2021-06-01

## 2021-06-01 VITALS — WEIGHT: 190.8 LBS | HEIGHT: 66 IN | BODY MASS INDEX: 30.67 KG/M2

## 2021-06-01 NOTE — PROGRESS NOTES
"Thank you for asking the [facility] team to see Tali Kumar      Assessment/Plan:   Coronary calcification - LDL and BP controlled. Asymptomatic.    Axilla discomfort - persistent and no change with exercise. Sounds very atypical for ischemia. She will monitor it and call if it progresses or changes with exertion. We discussed stress testing but she wishes to wait at this time.    Lipids - as above    Weight gain - weight training to increase her metabolism recommended. She will consider intermittent fasting.     F/U 2 years     Current History:   Tali Kumar is a 54 y.o. with extensive family history of cad and atypical chest pain found to have coronary calcification on CT with a normal stress test. She has been on pravastatin 80mg with LDL 63 this past spring.     Tali returns for f/u today. She continues to exercise and eat a healthy diet with limited processed foods. She has gained about 7 lbs this past month and thinks it must be related to menopause. There is no chest pain but she does have a persistent underarm discomfort that does not change with position, moving her arms, or exercise. She describes it as a \"razor burn\" and also like she is trying to hold something under her axilla. It is bilateral and never involves her chest or back.     Past Medical History:     Past Medical History:   Diagnosis Date     Coronary artery calcification seen on CT scan      Hyperlipidemia      Overweight (BMI 25.0-29.9)        Past Surgical History:     Past Surgical History:   Procedure Laterality Date     BLADDER NECK RECONSTRUCTION       BUNIONECTOMY Left      AK PROBE NASOLAC DUCT,INSERT TUBE/STENT      Description: Probing Of Nasolacrimal Duct With Insertion Of Tube;  Recorded: 05/21/2012;       Family History:     Family History   Problem Relation Age of Onset     Colon cancer Maternal Grandfather 65     Macular degeneration Maternal Grandfather      Stomach cancer Mother 59     Coronary artery disease " "Father      Parkinsonism Father      Pancreatic cancer Father 59     Stroke Maternal Grandmother      Parkinsonism Paternal Grandfather      Heart attack Paternal Uncle      ALS Paternal Aunt      Multiple sclerosis Other 35     Heart disease Neg Hx        Social History:    reports that she has never smoked. She has never used smokeless tobacco. She reports current alcohol use of about 10.0 standard drinks of alcohol per week. She reports that she does not use drugs.    Meds:     Current Outpatient Medications   Medication Sig     COQ10, UBIQUINOL, ORAL Take by mouth.            KRILL OIL ORAL Take by mouth.     pravastatin (PRAVACHOL) 80 MG tablet Take 1 tablet (80 mg total) by mouth at bedtime.       Allergies:   Patient has no known allergies.    Review of Systems:   Review of Systems:   General: WNL, Weight Gain  Eyes: WNL  Ears/Nose/Throat: WNL  Lungs: WNL  Heart: WNL  Stomach: WNL  Bladder: WNL  Muscle/Joints: WNL  Skin: WNL  Nervous System: WNL  Mental Health: WNL     Blood: WNL       Objective:      Physical Exam  @LASTENCWT:3@  5' 6.5\" (1.689 m)  @BMI:3@  /80   Pulse 66   Ht 5' 6.5\" (1.689 m)   Wt 190 lb (86.2 kg)   SpO2 99%   BMI 30.21 kg/m      General Appearance:   Alert, cooperative and in no acute distress.   HEENT:  No scleral icterus; the mucous membranes were pink and moist.   Neck: JVP flat. No thyromegaly. No HJR   Chest: The spine was straight. The chest was symmetric.   Lungs:   Respirations unlabored; the lungs are clear to auscultation.   Cardiovascular:   S1 and S2 normal and without murmur. No clicks or rubs. No carotid bruits noted. Right DP, PT, and radial pulses 2+. Left DP, PT, and radial pulses 2+.   Abdomen:  No organomegaly, masses, bruits, or tenderness. Bowels sounds are present   Extremities: No cyanosis, clubbing, or edema.   Skin: No xanthelasma.   Neurologic: Mood and affect are appropriate.         Lab Review   Lab Results   Component Value Date     04/16/2019 "     07/24/2018     07/28/2017    K 4.9 04/16/2019    K 4.7 07/24/2018    K 4.5 07/28/2017     04/16/2019     07/24/2018     07/28/2017    CO2 26 04/16/2019    CO2 23 07/24/2018    CO2 28 07/28/2017    BUN 13 04/16/2019    BUN 17 07/24/2018    BUN 10 07/28/2017    CREATININE 0.73 04/16/2019    CREATININE 0.75 07/24/2018    CREATININE 0.78 07/28/2017    CALCIUM 9.7 04/16/2019    CALCIUM 9.2 07/24/2018    CALCIUM 9.7 07/28/2017     Lab Results   Component Value Date    WBC 8.1 04/16/2019    WBC 7.3 08/30/2018    WBC 10.6 08/16/2017    HGB 13.9 04/16/2019    HGB 14.3 08/30/2018    HGB 13.3 07/24/2018    HCT 41.7 04/16/2019    HCT 41.6 08/30/2018    HCT 40.0 07/28/2017     04/16/2019    MCV 98 08/30/2018     07/28/2017     04/16/2019     08/30/2018     07/28/2017     Lab Results   Component Value Date    CHOL 155 04/05/2019    CHOL 193 07/24/2018    CHOL 151 07/28/2017    TRIG 66 04/05/2019    TRIG 116 07/24/2018    TRIG 48 07/28/2017    HDL 79 04/05/2019    HDL 72 07/24/2018    HDL 68 07/28/2017     No results found for: BNP      Cyndi Chew M.D.

## 2021-06-01 NOTE — PATIENT INSTRUCTIONS - HE
- Exercise 30 minutes, start doing some weight training    - Consider intermittent fasting. Look at the website [email or web address] for this    - See me in 2 years but call if the armpit discomfort worsens with exertion

## 2021-06-02 VITALS — BODY MASS INDEX: 30.5 KG/M2 | HEIGHT: 66 IN | WEIGHT: 189.8 LBS

## 2021-06-02 VITALS — HEIGHT: 66 IN | BODY MASS INDEX: 29.8 KG/M2 | WEIGHT: 185.4 LBS

## 2021-06-03 VITALS
HEIGHT: 67 IN | OXYGEN SATURATION: 99 % | HEART RATE: 66 BPM | DIASTOLIC BLOOD PRESSURE: 80 MMHG | BODY MASS INDEX: 29.82 KG/M2 | WEIGHT: 190 LBS | SYSTOLIC BLOOD PRESSURE: 126 MMHG

## 2021-06-04 VITALS
OXYGEN SATURATION: 99 % | DIASTOLIC BLOOD PRESSURE: 76 MMHG | SYSTOLIC BLOOD PRESSURE: 126 MMHG | HEART RATE: 55 BPM | HEIGHT: 66 IN | BODY MASS INDEX: 30.37 KG/M2 | WEIGHT: 189 LBS

## 2021-06-04 NOTE — TELEPHONE ENCOUNTER
Refill Approved    Rx renewed per Medication Renewal Policy. Medication was last renewed on 9/30/19.    Chetna Wilburn, [facility] Triage/Med Refill 12/9/2019     Requested Prescriptions   Pending Prescriptions Disp Refills     pravastatin (PRAVACHOL) 80 MG tablet [Pharmacy Med Name: PRAVASTATIN 80MG TABLETS] 90 tablet 0     Sig: TAKE 1 TABLET(80 MG) BY MOUTH EVERY EVENING       Statins Refill Protocol (Hmg CoA Reductase Inhibitors) Passed - 12/9/2019  8:01 AM        Passed - PCP or prescribing provider visit in past 12 months      Last office visit with prescriber/PCP: 4/16/2019 Cherry Mccauley CNP OR same dept: 4/16/2019 Cherry Mccauley CNP OR same specialty: 4/16/2019 Cherry Mccauley CNP  Last physical: 7/24/2018 Last MTM visit: Visit date not found   Next visit within 3 mo: Visit date not found  Next physical within 3 mo: Visit date not found  Prescriber OR PCP: Cherry Mccauley CNP  Last diagnosis associated with med order: 1. Hyperlipidemia LDL goal <70  - pravastatin (PRAVACHOL) 80 MG tablet [Pharmacy Med Name: PRAVASTATIN 80MG TABLETS]; TAKE 1 TABLET(80 MG) BY MOUTH EVERY EVENING  Dispense: 90 tablet; Refill: 0    If protocol passes may refill for 12 months if within 3 months of last provider visit (or a total of 15 months).

## 2021-06-05 ENCOUNTER — RECORDS - HEALTHEAST (OUTPATIENT)
Dept: CARDIOLOGY | Facility: CLINIC | Age: 56
End: 2021-06-05

## 2021-06-05 VITALS
TEMPERATURE: 97.7 F | WEIGHT: 196 LBS | DIASTOLIC BLOOD PRESSURE: 70 MMHG | SYSTOLIC BLOOD PRESSURE: 110 MMHG | BODY MASS INDEX: 31.64 KG/M2 | HEART RATE: 60 BPM

## 2021-06-05 VITALS — HEART RATE: 77 BPM | OXYGEN SATURATION: 98 % | HEIGHT: 66 IN | WEIGHT: 189 LBS | BODY MASS INDEX: 30.37 KG/M2

## 2021-06-05 DIAGNOSIS — R07.9 CHEST PAIN: ICD-10-CM

## 2021-06-05 DIAGNOSIS — R93.89 NONSPECIFIC (ABNORMAL) FINDINGS ON RADIOLOGICAL AND OTHER EXAMINATION OF OTHER INTRATHORACIC ORGANS: ICD-10-CM

## 2021-06-06 NOTE — TELEPHONE ENCOUNTER
Refill Approved    Rx renewed per Medication Renewal Policy. Medication was last renewed on 12/9/19.    Alda Gao, [facility] Triage/Med Refill 3/4/2020     Requested Prescriptions   Pending Prescriptions Disp Refills     pravastatin (PRAVACHOL) 80 MG tablet [Pharmacy Med Name: PRAVASTATIN 80MG TABLETS] 90 tablet 0     Sig: TAKE 1 TABLET(80 MG) BY MOUTH EVERY EVENING       Statins Refill Protocol (Hmg CoA Reductase Inhibitors) Passed - 3/4/2020  8:05 AM        Passed - PCP or prescribing provider visit in past 12 months      Last office visit with prescriber/PCP: 4/16/2019 Cherry Mccauley CNP OR same dept: 4/16/2019 Cherry Mccauley CNP OR same specialty: 4/16/2019 Cherry Mccauley CNP  Last physical: 7/24/2018 Last MTM visit: Visit date not found   Next visit within 3 mo: Visit date not found  Next physical within 3 mo: Visit date not found  Prescriber OR PCP: Cherry Mccauley CNP  Last diagnosis associated with med order: 1. Hyperlipidemia LDL goal <70  - pravastatin (PRAVACHOL) 80 MG tablet [Pharmacy Med Name: PRAVASTATIN 80MG TABLETS]; TAKE 1 TABLET(80 MG) BY MOUTH EVERY EVENING  Dispense: 90 tablet; Refill: 0    If protocol passes may refill for 12 months if within 3 months of last provider visit (or a total of 15 months).

## 2021-06-07 NOTE — PROGRESS NOTES
"Tali Kumar is a 54 y.o. female who is being evaluated via a billable telephone visit.      The patient has been notified of following:     \"This telephone visit will be conducted via a call between you and your physician/provider. We have found that certain health care needs can be provided without the need for a physical exam.  This service lets us provide the care you need with a short phone conversation.  If a prescription is necessary we can send it directly to your pharmacy.  If lab work is needed we can place an order for that and you can then stop by our lab to have the test done at a later time.    If during the course of the call the physician/provider feels a telephone visit is not appropriate, you will not be charged for this service.\"     Patient has given verbal consent to a Telephone visit? Yes    Tali Kumar complains of    Chief Complaint   Patient presents with     Eye Problem     Lefe eye- lump- w/ pain and swelling        I have reviewed and updated the patient's Past Medical History, Social History, Family History and Medication List.    ALLERGIES  Patient has no known allergies.    Additional provider notes:  Subjective:  54 female evaluated today via telephone for left eye lump and swelling.  It started 2 days ago and has been getting worse. There is no drainage or conjunctival injection.  Vision is normal and no headache is present.  She has a history of a stye however this has more redness into the cheek.  No fevers.      Current Outpatient Medications on File Prior to Visit   Medication Sig Dispense Refill     COQ10, UBIQUINOL, ORAL Take by mouth.              KRILL OIL ORAL Take by mouth.       pravastatin (PRAVACHOL) 80 MG tablet Take 1 tablet (80 mg total) by mouth at bedtime. 90 tablet 3     [DISCONTINUED] pravastatin (PRAVACHOL) 80 MG tablet TAKE 1 TABLET(80 MG) BY MOUTH EVERY EVENING 90 tablet 0     No current facility-administered medications on file prior to visit.      Past " Medical History:   Diagnosis Date     Coronary artery calcification seen on CT scan      Hyperlipidemia      Overweight (BMI 25.0-29.9)      Assessment/Plan:  1. Hordeolum externum of left lower eyelid  55 yo female with hordeolum of left lower eyelid and cellulitis extending into cheek.  Start Keflex and warm packs 3 times daily.  If she develops fevers, worsening redness, vision changes or headache, she needs to be seen  - cephalexin (KEFLEX) 500 MG capsule; Take 1 capsule (500 mg total) by mouth 3 (three) times a day for 7 days.  Dispense: 21 capsule; Refill: 0        Phone call duration: 10 minutes    Tyra Marquez, CMA

## 2021-06-08 NOTE — PROGRESS NOTES
"Tali Kumar is a 55 y.o. female who is being evaluated via a billable video visit.      The patient has been notified of following:     \"This video visit will be conducted via a call between you and your physician/provider. We have found that certain health care needs can be provided without the need for an in-person physical exam.  This service lets us provide the care you need with a video conversation.  If a prescription is necessary we can send it directly to your pharmacy.  If lab work is needed we can place an order for that and you can then stop by our lab to have the test done at a later time.    Video visits are billed at different rates depending on your insurance coverage. Please reach out to your insurance provider with any questions.    If during the course of the call the physician/provider feels a video visit is not appropriate, you will not be charged for this service.\"    Patient has given verbal consent to a Video visit? Yes    Patient would like to receive their AVS by AVS Preference: Sarkis.    Patient would like the video invitation sent by: Text to cell phone: [phone]    Will anyone else be joining your video visit? No        Video Start Time: 11:03 PM     Additional provider notes:  Assessment and Plan:     1. Lymphadenopathy  We will obtain an ultrasound of her neck for further evaluation.  She will follow-up for lab only appointment for hemogram.  Due to bilateral axillary pain, will obtain ultrasounds of these areas of as well.  She has not had any rashes present.  - US Neck Limited; Future  - HM2(CBC w/o Differential); Future  - US Axilla Non Vascular Right; Future  - US Axilla Non Vascular Left; Future    2. Polyarthralgia  We will rule out vitamin D deficiency, inflammation, Lyme's, autoimmune disease.  Will refer to rheumatology due to history of Raynaud's.   - Ambulatory referral to Rheumatology  - Vitamin D, Total (25-Hydroxy); Future  - Erythrocyte Sedimentation Rate; " "Future  - Lyme Antibody Cascade; Future  - Antinuclear Antibody ([place]) Cascade; Future  - Rheumatoid Factor Quant; Future    3. Hyperlipidemia LDL goal < 70  She continues pravastatin.  She will follow-up for fasting cholesterol check.  - Lipid [place]; Future    4. Coronary artery disease involving native coronary artery of native heart without angina pectoris  She continues to see cardiology.  She continues pravastatin.  She is content with the plan.    5. Medication management  - Comprehensive Metabolic Panel; Future        Subjective:     Tali is a 55 y.o. female presenting for a video visit.  She has a history of hyperlipidemia, CAD, vitamin D deficiency.  Patient states last night, she was feeling her neck when she palpated a lump within her right anterior neck.  Patient states it is slightly tender now that she has been rubbing on the area.  She believes it is the size of a pea.  She has not had any recent cold symptoms or fever, but does admit to an insect bite near her right ear.  She has not had any teeth pain.  She does wear a mouthguard at night.  She has not been experiencing headaches, fatigue, loss.  She has had some night sweats at bedtime, but attributes that to menopause.  Patient also complains of polyarthralgias.  She has had bilateral knee pain over the past year.  This improves with ambulation.  Patient states in the morning, she feels as though \"her feet don't work well right away \".  She also has toe and ankle pain.  Her PCP in 2016 ordered autoimmune labs which were normal.  Patient does walk 4 to 6 miles per day.  She experiences Raynaud's.  She has had intermittent burning pain within both axilla over the past 9 months.  She feels as though her rash is present, but has not seen a rash.  She has not palpated any lumps or noticed any swelling.  She had a normal mammogram in October 2019.    Review of Systems: A complete 14 point review of systems was obtained and is negative or as stated in " the history of present illness.    Social History     Socioeconomic History     Marital status:      Spouse name: Not on file     Number of children: Not on file     Years of education: Not on file     Highest education level: Not on file   Occupational History     Not on file   Social Needs     Financial resource strain: Not on file     Food insecurity     Worry: Not on file     Inability: Not on file     Transportation needs     Medical: Not on file     Non-medical: Not on file   Tobacco Use     Smoking status: Never Smoker     Smokeless tobacco: Never Used   Substance and Sexual Activity     Alcohol use: Yes     Alcohol/week: 10.0 standard drinks     Types: 5 Glasses of wine, 5 Cans of beer per week     Drug use: No     Sexual activity: Not on file     Comment:     Lifestyle     Physical activity     Days per week: Not on file     Minutes per session: Not on file     Stress: Not on file   Relationships     Social connections     Talks on phone: Not on file     Gets together: Not on file     Attends Taoist service: Not on file     Active member of club or organization: Not on file     Attends meetings of clubs or organizations: Not on file     Relationship status: Not on file     Intimate partner violence     Fear of current or ex partner: Not on file     Emotionally abused: Not on file     Physically abused: Not on file     Forced sexual activity: Not on file   Other Topics Concern     Not on file   Social History Narrative     Not on file       Active Ambulatory Problems     Diagnosis Date Noted     Vitamin D Deficiency      Limb Pain      Rosacea      Vertigo      Basal Cell Carcinoma Of The Skin      CAD (coronary artery disease), native coronary artery 10/29/2015     Hyperlipidemia LDL goal < 70 10/29/2015     Colon polyp 05/18/2016     Overweight (BMI 25.0-29.9) 03/02/2017     Resolved Ambulatory Problems     Diagnosis Date Noted     Obesity      Past Medical History:   Diagnosis Date      Coronary artery calcification seen on CT scan      Hyperlipidemia        Family History   Problem Relation Age of Onset     Colon cancer Maternal Grandfather 65     Macular degeneration Maternal Grandfather      Stomach cancer Mother 59     Coronary artery disease Father      Parkinsonism Father      Pancreatic cancer Father 59     Stroke Maternal Grandmother      Parkinsonism Paternal Grandfather      Heart attack Paternal Uncle      ALS Paternal Aunt      Multiple sclerosis Other 35     Heart disease Neg Hx        Objective:     There were no vitals taken for this visit.     GENERAL: Healthy, alert and no distress  EYES: Eyes grossly normal to inspection. No discharge or erythema, or obvious scleral/conjunctival abnormalities.  HENT: Normal cephalic/atraumatic.  External ears, nose and mouth without ulcers or lesions. No nasal drainage visible.  NECK: patient points to her anterior cervical lymph node chain has having an enlarged lymph node.    RESP: No audible wheeze, cough, or visible cyanosis.  No visible retractions or increased work of breathing.    MS: No gross musculoskeletal defects noted.  Normal range of motion. No visible edema.  SKIN: Visible skin clear. No significant rash, abnormal pigmentation or lesions.  NEURO: Cranial nerves grossly intact. Mentation and speech appropriate for age.  PSYCH: Mentation appears normal, affect normal/bright, judgement and insight intact, normal speech and appearance well-groomed      Video-Visit Details    Type of service:  Video Visit    Video End Time (time video stopped): 11:25 PM   Originating Location (pt. Location): Home    Distant Location (provider location):  [facility]     Platform used for Video Visit: Geeta Mccauley CNP

## 2021-06-08 NOTE — TELEPHONE ENCOUNTER
RN cannot approve Refill Request    RN can NOT refill this medication Protocol failed and NO refill given.      Alda Gao, [facility] Triage/Med Refill 6/4/2020    Requested Prescriptions   Pending Prescriptions Disp Refills     pravastatin (PRAVACHOL) 80 MG tablet [Pharmacy Med Name: PRAVASTATIN 80MG TABLETS] 90 tablet 3     Sig: TAKE 1 TABLET BY MOUTH EVERY EVENING       Statins Refill Protocol (Hmg CoA Reductase Inhibitors) Failed - 6/2/2020  5:56 PM        Failed - PCP or prescribing provider visit in past 12 months      Last office visit with prescriber/PCP: 4/16/2019 Cherry Mccauley CNP OR same dept: Visit date not found OR same specialty: 4/16/2019 Cherry Mccauley CNP  Last physical: 7/24/2018 Last MTM visit: Visit date not found   Next visit within 3 mo: Visit date not found  Next physical within 3 mo: Visit date not found  Prescriber OR PCP: Cherry Mccauley CNP  Last diagnosis associated with med order: 1. Hyperlipidemia LDL goal <70  - pravastatin (PRAVACHOL) 80 MG tablet [Pharmacy Med Name: PRAVASTATIN 80MG TABLETS]; TAKE 1 TABLET BY MOUTH EVERY EVENING  Dispense: 90 tablet; Refill: 3    If protocol passes may refill for 12 months if within 3 months of last provider visit (or a total of 15 months).

## 2021-06-09 NOTE — PROGRESS NOTES
Assessment and Plan:    1. Routine general medical examination at a health care facility  Discussed consuming a healthy diet and exercising.  She declines hepatitis C screening.  She declines shingles vaccine.    2. Coronary artery disease involving native coronary artery of native heart without angina pectoris  She continues pravastatin 80 mg daily.    3. Hyperlipidemia LDL goal < 70  This is controlled.  She continues pravastatin.    4. Vitamin D deficiency  We will check a vitamin D as patient has been taking too much vitamin D recently.  She is now currently taking 2000 units daily which is optimal.  Will notify patient of results.  - Vitamin D, Total (25-Hydroxy); Future    5. Class 1 obesity due to excess calories with serious comorbidity and body mass index (BMI) of 30.0 to 30.9 in adult  The following high BMI interventions were performed this visit: encouragement to exercise and lifestyle education regarding diet    Subjective:     Tali is a 55 y.o. female presenting to the clinic for a female physical.     LMP: 7/13/18  Hx of abnormal pap smear: none   Last pap smear: 4/2/15 normal  Perform self-breast exams: yes, feeling heavy   Vaginal discharge or irritation: none   Sexually active: yes,    Contraception: none   Concerns for STDs: none   Previous pregnancies:none     Patient had an abnormal coronary calcium score on 10/16/14.  Score was 79.  Patient is taking pravastatin 40 mg daily.  Last cholesterol check was on 7/28/70 with a total cholesterol 151, triglycerides 48, LDL 73, HDL 68.  She denies chest pain, shortness of breath with exertion, edema, orthopnea, syncope.      Patient is seeing rheumatology for joint pain.  Her joint pain has improved since starting vitamin D.  She believes that she has been taking 50,000 units twice daily, though, instead of twice weekly.  She is now taking 2000 units of vitamin D3 daily.    Review of systems:  I performed a 10 point review of systems.  All  pertinent positives and negatives are noted in the HPI. All others are negative.     No Known Allergies    Current Outpatient Medications on File Prior to Visit   Medication Sig Dispense Refill     calcium citrate-vitamin D (CITRACAL+D) 315-200 mg-unit per tablet Take 1 tablet by mouth daily.       COQ10, UBIQUINOL, ORAL Take by mouth.              cyclobenzaprine (FLEXERIL) 10 MG tablet Take 1 tab p.o. qhs (if feeling groggy the following morning, can try taking half a tablet instead or can take earlier the night before). 30 tablet 2     KRILL OIL ORAL Take by mouth.       pravastatin (PRAVACHOL) 80 MG tablet TAKE 1 TABLET BY MOUTH EVERY EVENING 90 tablet 3     ergocalciferol (ERGOCALCIFEROL) 1,250 mcg (50,000 unit) capsule Take 1 capsule (50,000 Units total) by mouth 2 (two) times a week for 24 doses. 24 capsule 0     No current facility-administered medications on file prior to visit.        Social History     Socioeconomic History     Marital status:      Spouse name: Not on file     Number of children: Not on file     Years of education: Not on file     Highest education level: Not on file   Occupational History     Not on file   Social Needs     Financial resource strain: Not on file     Food insecurity     Worry: Not on file     Inability: Not on file     Transportation needs     Medical: Not on file     Non-medical: Not on file   Tobacco Use     Smoking status: Never Smoker     Smokeless tobacco: Never Used   Substance and Sexual Activity     Alcohol use: Yes     Alcohol/week: 10.0 standard drinks     Types: 5 Glasses of wine, 5 Cans of beer per week     Drug use: No     Sexual activity: Not on file     Comment:     Lifestyle     Physical activity     Days per week: Not on file     Minutes per session: Not on file     Stress: Not on file   Relationships     Social connections     Talks on phone: Not on file     Gets together: Not on file     Attends Sabianism service: Not on file     Active  member of club or organization: Not on file     Attends meetings of clubs or organizations: Not on file     Relationship status: Not on file     Intimate partner violence     Fear of current or ex partner: Not on file     Emotionally abused: Not on file     Physically abused: Not on file     Forced sexual activity: Not on file   Other Topics Concern     Not on file   Social History Narrative     Not on file       Past Medical History:   Diagnosis Date     Coronary artery calcification seen on CT scan      Hyperlipidemia      Overweight (BMI 25.0-29.9)        Family History   Problem Relation Age of Onset     Colon cancer Maternal Grandfather 65     Macular degeneration Maternal Grandfather      Stomach cancer Mother 59     Coronary artery disease Father      Parkinsonism Father      Pancreatic cancer Father 59     Stroke Maternal Grandmother      Parkinsonism Paternal Grandfather      Heart attack Paternal Uncle      ALS Paternal Aunt      Multiple sclerosis Other 35     Parkinsonism Cousin      Heart disease Neg Hx        Past Surgical History:   Procedure Laterality Date     BLADDER NECK RECONSTRUCTION       BUNIONECTOMY Left      [place] PROBE NASOLAC DUCT,INSERT TUBE/STENT      Description: Probing Of Nasolacrimal Duct With Insertion Of Tube;  Recorded: 05/21/2012;       Objective:     Vitals:    07/09/20 0714   BP: 126/76   Pulse: (!) 55   SpO2: 99%       Patient is alert, no obvious distress.   Skin: Warm, dry.  No rashes or lesions. Skin turgor rapid return.   HEENT:  Eyes normal.  Ears normal.  Nose patent, mucosa pink.  Oropharynx mucosa pink, no lesions or tonsil enlargement.   Neck:  Supple, without lymphadenopathy, bruits, JVD. Thyroid normal texture and size.    Lungs:  Clear to auscultation.  No wheezing, rales noted.  Respirations even and unlabored.   Heart:  Regular rate and rhythm.  No murmurs.   Breasts:  Normal.  No surrounding adenopathy.   Abdomen: Soft, nontender.  No organomegaly.  Bowel sounds  normoactive.  No guarding or masses noted.   :  deferred  Musculoskeletal:  Full ROM of extremities.  Muscle strength equal +5/5.   Neurological:  Cranial nerves 2-12 intact.

## 2021-06-09 NOTE — PROGRESS NOTES
Thank you for asking the [facility] team to see Tali Kumar in consultation      Assessment/Plan:   Cad without ischemia on stress test and no angina. Pt has a healthy diet and exercises daily. She will work on titrating her exercise to help her over her weight loss plateau. Lipids to be rechecked in the spring with her pcp. Encouraged her to continue to work on her healthy lifestyle.      FU 1 year        Current History:   Tali Kumar is a 51 y.o. with extensive family history of cad and atypical chest pain found to have coronary calcification on CT with a normal stress test. She has been on pravastatin 40mg and has lost 10 lbs. Her LDL was initially 120 and was 89 last spring.     She returns for f/u today and to refill her pravastatin. She is still walking up to 4 miles daily. Denies cp/sob/lh/palpitations. Feels well.      Past Medical History:     Past Medical History:   Diagnosis Date     Coronary artery calcification seen on CT scan      Hyperlipidemia      Overweight (BMI 25.0-29.9)        Past Surgical History:     Past Surgical History:   Procedure Laterality Date     [place] PROBE NASOLAC DUCT,INSERT TUBE/STENT      Description: Probing Of Nasolacrimal Duct With Insertion Of Tube;  Recorded: 05/21/2012;       Family History:     Family History   Problem Relation Age of Onset     Colon cancer Maternal Grandfather 65     Macular degeneration Maternal Grandfather      Stomach cancer Mother 59     Coronary artery disease Father      Parkinsonism Father      Pancreatic cancer Father 59     Stroke Maternal Grandmother      Parkinsonism Paternal Grandfather      Heart attack Paternal Uncle      ALS Paternal Aunt      Multiple sclerosis Other 35       Social History:    reports that she has never smoked. She has never used smokeless tobacco.    Meds:     Current Outpatient Prescriptions   Medication Sig     COQ10, UBIQUINOL, ORAL Take by mouth. CoQ10 with Krill Oil 600 mg.     pravastatin (PRAVACHOL)  "40 MG tablet TAKE 1 TABLET BY MOUTH AT BEDTIME. Due for follow up with Dr. Chew     GLUCOSAMINE HCL/CHONDR VILLAFANA A NA (OSTEO BI-FLEX ORAL) Take by mouth.       Allergies:   Review of patient's allergies indicates no known allergies.    Review of Systems:   Review of Systems:   General: WNL  Eyes: WNL  Ears/Nose/Throat: WNL  Lungs: WNL  Heart: WNL  Stomach: WNL  Bladder: WNL  Muscle/Joints: Joint Pain  Skin: WNL  Nervous System: WNL  Mental Health: WNL     Blood: WNL       Objective:      Physical Exam  @LASTENCWT:3@  5' 6.25\" (1.683 m)  @BMI:3@  Visit Vitals     /80 (Patient Site: Right Arm, Patient Position: Sitting, Cuff Size: Adult Regular)     Pulse 64     Resp 20     Ht 5' 6.25\" (1.683 m)     Wt 184 lb 9.6 oz (83.7 kg)     BMI 29.57 kg/m2       General Appearance:   Alert, cooperative and in no acute distress.   HEENT:  No scleral icterus; the mucous membranes were pink and moist.   Neck: JVP flat. No thyromegaly. No HJR   Chest: The spine was straight. The chest was symmetric.   Lungs:   Respirations unlabored; the lungs are clear to auscultation.   Cardiovascular:   S1 and S2 normal and without murmur. No clicks or rubs. No carotid bruits noted. Right DP, PT pulses 2+. Left DP, PT, and radial pulses 2+. Right radial 1+.   Abdomen:  No organomegaly, masses, bruits, or tenderness. Bowels sounds are present   Extremities: No cyanosis, clubbing, or edema.   Skin: No xanthelasma.   Neurologic: Mood and affect are appropriate.         Lab Review   Lab Results   Component Value Date     04/07/2016     04/02/2015     10/13/2014    K 4.9 04/07/2016    K 4.7 04/02/2015    K 4.8 10/13/2014     04/07/2016     04/02/2015     10/13/2014    CO2 25 04/07/2016    CO2 25 04/02/2015    CO2 18 (L) 10/13/2014    BUN 7 (L) 04/07/2016    BUN 16 04/02/2015    BUN 15 10/13/2014    CREATININE 0.79 04/07/2016    CREATININE 0.81 04/02/2015    CREATININE 0.83 10/13/2014    CALCIUM 9.2 " 04/07/2016    CALCIUM 9.4 04/02/2015    CALCIUM 9.2 10/13/2014     Lab Results   Component Value Date    WBC 7.4 04/07/2016    WBC 6.9 10/13/2014    WBC 8.1 01/30/2014    HGB 14.5 04/07/2016    HGB 14.6 10/13/2014    HGB 13.8 01/30/2014    HCT 42.0 04/07/2016    HCT 43.9 10/13/2014    HCT 42.0 01/30/2014    MCV 97 04/07/2016    MCV 99 10/13/2014    MCV 97 01/30/2014     04/07/2016     10/13/2014     01/30/2014     Lab Results   Component Value Date    CHOL 164 04/07/2016    CHOL 174 04/02/2015    CHOL 199 01/30/2014    TRIG 54 04/07/2016    TRIG 80 04/02/2015    TRIG 97 01/30/2014    HDL 64 04/07/2016    HDL 63 04/02/2015    HDL 62 01/30/2014     No results found for: BNP      Cyndi Chew M.D.

## 2021-06-09 NOTE — PATIENT INSTRUCTIONS - HE
Summary of Your Rheumatology Visit    Next Appointment:    Months    Medications:       Referrals:       Tests:         Injections:         Other:

## 2021-06-09 NOTE — PROGRESS NOTES
Tali Kumar who presents today with a chief complaint of  Follow-up      Joint Pains: Yes  Location: both leg   Onset: Chronic  Intensity:  8/10  AM Stiffness:10 Minutes  Alleviating/Aggravating Factors: Medications helpful yes   Tolerating Meds: Yes  Other:      ROS:  Patient denies having: persistent dry eyes, +dry mouth, recurrent oral ulcers, patchy alopecia, active rashes, photosensitivity, history of psoriasis, active chest pain, active shortness of breath, active cough, active dysuria, history of kidney stones, active abdominal pain, active diarrhea, history of hematochezia, active dysphagia, history of peptic ulcer disease, history of HIV, tuberculosis, hepatitis B or C, Lyme disease, seizure history, +raynaud's, active documented fevers, recent infections,+ difficulty sleeping or chronic unrefreshing sleep, involuntary weight loss, loss of appetite, excessive fatigue, depression, anxiety,  recurrent sinus infections, history of inflammatory eye diseases (such as uveitis, scleritis, iritis, etc).     Denies having: Limb weakness, history of miscarriages, recent bone density, fracture history, frequent temporal headaches, jaw claudication, scalp tenderness, double vision loss of visual fields or painful vision].    Information gathered by medical assistant incorporated into this note, was reviewed and discussed with the patient.    Problem List:  Patient Active Problem List   Diagnosis     Vitamin D Deficiency     Limb Pain     Rosacea     Vertigo     Basal Cell Carcinoma Of The Skin     CAD (coronary artery disease), native coronary artery     Hyperlipidemia LDL goal < 70     Colon polyp     Overweight (BMI 25.0-29.9)        PMH:   Past Medical History:   Diagnosis Date     Coronary artery calcification seen on CT scan      Hyperlipidemia      Overweight (BMI 25.0-29.9)        Surgical History:  Past Surgical History:   Procedure Laterality Date     BLADDER NECK RECONSTRUCTION       BUNIONECTOMY Left       "SC PROBE NASOLAC DUCT,INSERT TUBE/STENT      Description: Probing Of Nasolacrimal Duct With Insertion Of Tube;  Recorded: 05/21/2012;       Family History:  Family History   Problem Relation Age of Onset     Colon cancer Maternal Grandfather 65     Macular degeneration Maternal Grandfather      Stomach cancer Mother 59     Coronary artery disease Father      Parkinsonism Father      Pancreatic cancer Father 59     Stroke Maternal Grandmother      Parkinsonism Paternal Grandfather      Heart attack Paternal Uncle      ALS Paternal Aunt      Multiple sclerosis Other 35     Heart disease Neg Hx        Social History:   reports that she has never smoked. She has never used smokeless tobacco. She reports current alcohol use of about 10.0 standard drinks of alcohol per week. She reports that she does not use drugs.    Allergies:  No Known Allergies     Current Medications:  Current Outpatient Medications   Medication Sig Dispense Refill     calcium citrate-vitamin D (CITRACAL+D) 315-200 mg-unit per tablet Take 1 tablet by mouth daily.       COQ10, UBIQUINOL, ORAL Take by mouth.              ergocalciferol (ERGOCALCIFEROL) 1,250 mcg (50,000 unit) capsule Take 1 capsule (50,000 Units total) by mouth 2 (two) times a week for 24 doses. 24 capsule 0     KRILL OIL ORAL Take by mouth.       pravastatin (PRAVACHOL) 80 MG tablet TAKE 1 TABLET BY MOUTH EVERY EVENING 90 tablet 3     No current facility-administered medications for this visit.            Physical Exam:  There were no vitals taken for this visit.  General: A & O x 3 in JANES Kumar is a 55 y.o. female who is being evaluated via a billable video visit.      The patient has been notified of following:     \"This video visit will be conducted via a call between you and your physician/provider. We have found that certain health care needs can be provided without the need for an in-person physical exam.  This service lets us provide the care you need with a video " "conversation.  If a prescription is necessary we can send it directly to your pharmacy.  If lab work is needed we can place an order for that and you can then stop by our lab to have the test done at a later time.    Video visits are billed at different rates depending on your insurance coverage. Please reach out to your insurance provider with any questions.    If during the course of the call the physician/provider feels a video visit is not appropriate, you will not be charged for this service.\"    Patient has given verbal consent to a Video visit? Yes  How would you like to obtain your AVS? AVS Preference: MyChart.  Patient would like the video invitation sent by: Text to cell phone: [phone]   Will anyone else be joining your video visit? No        Video Start Time: 3:52 PM    Additional provider notes:       Video-Visit Details    Type of service:  Video Visit    Video End Time (time video stopped): 4:31 PM  Originating Location (pt. Location): Home    Distant Location (provider location):  [facility] RHEUMATOLOGY     Platform used for Video Visit: Geeta Saldana DO        Summary/Assessment:    Pleasant 55-year-old female presents with chronic myalgias involving the lower extremities from hips distally.    Patient describes having the symptoms particularly after prolonged inactivity, typically lasting about a minute.    Patient adds that she walks a lot, does not have discomfort when walking.    Adds that vitamin D recently recommended due to vitamin D deficiency, currently taking 1000 units twice a day.  Since starting vitamin D has noticed less lower extremity myalgias.    Denies having associated low back pain, lower extremity paresthesias or lower extremity weakness.    Describes component of myalgias involving some tightness over calf regions.  Component of her thigh pains involves lateral hips, told in the past may be related to bursitis.  Admits to having " discomfort over the lateral hip region when lying against this area at night.    Patient also has some discomfort involving her heels first in the morning, believes related to her history of plantar fasciitis.  Has not been performing exercises provided in the past.    Also complains of having Raynaud's-like symptoms going on for about 10 to 12 years, states it is on the mild side and usually only active during colder months.  Can affect both her hands and feet.  Denies tobacco use.    Noted to have negative/unremarkable: AMERICA, rheumatoid factor, Lyme antibody, ESR,    Has family history for neurological diseases: Father and grandfather with Parkinson's, cousin with multiple sclerosis and an aunt with ALS.    Please see below for management plan.      Pertinent rheumatology/past medical history (please refer to above for more detailed history):      Lower extremity myalgias    Raynaud's    Plantar fasciitis    Vitamin D deficiency    Possible trochanteric bursitis    Family history for neurological diseases (Parkinson's, MS and ALS)    Alcohol consumption (6-9 drinks from Friday through Sunday)    Nonrestorative sleep      Rheumatology medications provided/suggested:    Flexeril      Pertinent medication from other providers or from otc (please refer to above for more detailed med list):    Vitamin D  Statin    Pertinent medications already tried:         Pertinent lab history:    Negative/unremarkable: AMERICA, rheumatoid factor, Lyme antibody, ESR, CBC, creatinine    Pertinent imaging/test history:        Other:    Denies tobacco use    Currently going through menopause     plan:      Continue taking vitamin D 1000 units twice a day, has helped her lower extremity myalgias.  Patient states that she is due to have a repeat vitamin D level checked via PCP.    We will add Flexeril to act as a muscle relaxant hopefully improve her sleep.    Regardingyalgias and Raynaud's, will obtain several lab markers and correlate  clinically.    Follow-up in 4 to 6 weeks           Procedure note:       Major side effect profile of medications provided/suggested were discussed with the patient.    This note was transcribed using Dragon voice recognition software as a result unintentional grammatical errors or word substitutions may have occurred. Please contact our Rheumatology department if you need any clarification or if you have any related inquiries.    Thank you for referring this patient to our clinic.    Chris Saldana DO....................  7/8/2020   11:39 AM

## 2021-06-11 NOTE — PROGRESS NOTES
Assessment and Plan:     1. Thrush  nystatin (MYCOSTATIN) 100,000 unit/mL suspension   2. Sore throat  Rapid Strep A Screen-Throat    Group A Strep, RNA Direct Detection, Throat    Chlamydia trachomatis by STEPHEN (non-urogenital)     I suspect patient has thrush.  Will treat with nystatin swish and spit 4 times daily.  She will continue this until the discoloration is gone and for 48 hours after.  Will check for gonorrhea and chlamydia due to recent oral sex with a new partner.  Discussed safe sex practices.  She will follow-up with her primary care provider if symptoms persist or worsen.    Subjective:     Tali is a 52 y.o. female presenting to the clinic for concerns for sore throat.  Patient states 1 week ago she developed white spots within her mouth.  She noticed one on the uvula and one on the roof of the mouth.  She feels a burning sensation on her tongue and has noticed a white discoloration.  She has had an intermittent sore throat.  She denies recent cold symptoms including rhinorrhea, headache, stomachache, nausea, vomiting, fever.  She has had postnasal drainage.  She has not recently taken an antibiotic.  She has not tried any over-the-counter products for symptoms.  No one else around her is ill. She recently had oral sex with a new partner. She did consume alcohol last weekend.     Review of Systems: A complete 14 point review of systems was obtained and is negative or as stated in the history of present illness.    Social History     Social History     Marital status:      Spouse name: N/A     Number of children: N/A     Years of education: N/A     Occupational History     Not on file.     Social History Main Topics     Smoking status: Never Smoker     Smokeless tobacco: Never Used     Alcohol use Not on file     Drug use: Not on file     Sexual activity: Not on file     Other Topics Concern     Not on file     Social History Narrative       Active Ambulatory Problems     Diagnosis Date Noted  "    Vitamin D Deficiency      Limb Pain      Rosacea      Vertigo      Basal Cell Carcinoma Of The Skin      CAD (coronary artery disease), native coronary artery 10/29/2015     Hyperlipidemia LDL goal < 70 10/29/2015     Colon polyp 05/18/2016     Overweight (BMI 25.0-29.9) 03/02/2017     Resolved Ambulatory Problems     Diagnosis Date Noted     Obesity      Past Medical History:   Diagnosis Date     Coronary artery calcification seen on CT scan      Hyperlipidemia      Overweight (BMI 25.0-29.9)        Family History   Problem Relation Age of Onset     Colon cancer Maternal Grandfather 65     Macular degeneration Maternal Grandfather      Stomach cancer Mother 59     Coronary artery disease Father      Parkinsonism Father      Pancreatic cancer Father 59     Stroke Maternal Grandmother      Parkinsonism Paternal Grandfather      Heart attack Paternal Uncle      ALS Paternal Aunt      Multiple sclerosis Other 35       Objective:     /62 (Patient Site: Right Arm, Patient Position: Sitting, Cuff Size: Adult Regular)  Pulse 68  Temp 97.5  F (36.4  C)  Ht 5' 6.5\" (1.689 m)  Wt 186 lb 3.2 oz (84.5 kg)  SpO2 100%  BMI 29.6 kg/m2    Patient is alert, in no obvious distress.   Skin: Warm, dry.  No lesions or rashes.  Skin turgor rapid return.   HEENT:  Head normocephalic, atraumatic.  Eyes normal. Ears normal.  Nose patent, mucosa pink.  Oropharynx mucosa pink. She has a white discoloration noted on the distal portion of the uvula and the roof of her mouth.  Her tongue appears white in color.  I swabbed for gonorrhea and chlamydia and was able to remove the white discoloration on the roof of the mouth.   No lesions or tonsillar enlargement.   Neck: Supple, no lymphadenopathy.   Lungs:  Clear to auscultation. Respirations even and unlabored.  No wheezing or rales noted.   Heart:  Regular rate and rhythm.  No murmurs.    LABORATORY: Rapid strep, strep culture, gonorrhea and chlamydia ordered.  Rapid strep is " negative.

## 2021-06-12 ENCOUNTER — HEALTH MAINTENANCE LETTER (OUTPATIENT)
Age: 56
End: 2021-06-12

## 2021-06-12 NOTE — PROGRESS NOTES
FOOT AND ANKLE SURGERY/PODIATRY CONSULT NOTE         ASSESSMENT:   Plantar fasciitis right foot      TREATMENT:  The patient was given a cortisone injection in the right heel today consisting of 1 cc of dexamethasone sodium phosphate and 1 cc of 2% lidocaine plain.  She was started on ibuprofen 600 mg 1 tab 3 times daily.  I have also recommended orthotics.        HPI: Tali Kumar presented to the clinic today today complaining of severe pain on the bottom of the right heel.  She stated that she also has a burning pain on the outside of her right heel.  The pain is aggravated with weightbearing and ambulation.  She stated that the pain is more pronounced when she first gets out of bed in the mornings.  She has post static dyskinesia.  She denies trauma to the right foot.  She has had this pain for 18 months.  She denies any associated redness or swelling with her heel pain.  The pain is relieved with nonweightbearing.  She denies any other previous treatment.      Past Medical History:   Diagnosis Date     Coronary artery calcification seen on CT scan      Hyperlipidemia      Overweight (BMI 25.0-29.9)        Past Surgical History:   Procedure Laterality Date     BLADDER NECK RECONSTRUCTION       BUNIONECTOMY Left      WA PROBE NASOLAC DUCT,INSERT TUBE/STENT      Description: Probing Of Nasolacrimal Duct With Insertion Of Tube;  Recorded: 05/21/2012;       No Known Allergies      Current Outpatient Medications:      calcium citrate-vitamin D (CITRACAL+D) 315-200 mg-unit per tablet, Take 1 tablet by mouth daily., Disp: , Rfl:      COQ10, UBIQUINOL, ORAL, Take by mouth.    , Disp: , Rfl:      cyclobenzaprine (FLEXERIL) 10 MG tablet, Take 1 tab p.o. qhs (if feeling groggy the following morning, can try taking half a tablet instead or can take earlier the night before)., Disp: 30 tablet, Rfl: 2     KRILL OIL ORAL, Take by mouth., Disp: , Rfl:      pravastatin (PRAVACHOL) 80 MG tablet, TAKE 1 TABLET BY MOUTH EVERY  EVENING, Disp: 90 tablet, Rfl: 3    Family History   Problem Relation Age of Onset     Colon cancer Maternal Grandfather 65     Macular degeneration Maternal Grandfather      Stomach cancer Mother 59     Coronary artery disease Father      Parkinsonism Father      Pancreatic cancer Father 59     Stroke Maternal Grandmother      Parkinsonism Paternal Grandfather      Heart attack Paternal Uncle      ALS Paternal Aunt      Multiple sclerosis Other 35     Parkinsonism Cousin      Heart disease Neg Hx        Social History     Socioeconomic History     Marital status:      Spouse name: Not on file     Number of children: Not on file     Years of education: Not on file     Highest education level: Not on file   Occupational History     Not on file   Social Needs     Financial resource strain: Not on file     Food insecurity     Worry: Not on file     Inability: Not on file     Transportation needs     Medical: Not on file     Non-medical: Not on file   Tobacco Use     Smoking status: Never Smoker     Smokeless tobacco: Never Used   Substance and Sexual Activity     Alcohol use: Yes     Alcohol/week: 10.0 standard drinks     Types: 5 Glasses of wine, 5 Cans of beer per week     Drug use: No     Sexual activity: Not on file     Comment:     Lifestyle     Physical activity     Days per week: Not on file     Minutes per session: Not on file     Stress: Not on file   Relationships     Social connections     Talks on phone: Not on file     Gets together: Not on file     Attends Hindu service: Not on file     Active member of club or organization: Not on file     Attends meetings of clubs or organizations: Not on file     Relationship status: Not on file     Intimate partner violence     Fear of current or ex partner: Not on file     Emotionally abused: Not on file     Physically abused: Not on file     Forced sexual activity: Not on file   Other Topics Concern     Not on file   Social History Narrative      Not on file       Review of Systems - Patient denies fever, chills, rash, wound, stiffness, limping, numbness, weakness, heart burn, blood in stool, chest pain with activity, calf pain when walking, shortness of breath with activity, chronic cough, easy bleeding/bruising, swelling of ankles, excessive thirst, fatigue, depression, anxiety.  Patient admits to right heel pain.      OBJECTIVE:  Appearance: alert, well appearing, and in no distress.    Vitals:    10/28/20 1138   Pulse: 77   SpO2: 98%       BMI= Body mass index is 30.51 kg/m .    General appearance: Patient is alert and fully cooperative with history & exam.  No sign of distress is noted during the visit.  Psychiatric: Affect is pleasant & appropriate.  Patient appears motivated to improve health.  Respiratory: Breathing is regular & unlabored while sitting.  HEENT: Hearing is intact to spoken word.  Speech is clear.  No gross evidence of visual impairment that would impact ambulation.    Vascular: Dorsalis pedis and posterior tibial pulses are palpable. There is no pedal hair growth bilaterally.  CFT < 3 sec from anterior tibial surface to distal digits bilaterally. There is no appreciable edema noted.  Dermatologic: Turgor and texture are within normal limits. No coloration or temperature changes. No primary or secondary lesions noted.  Neurologic: All epicritic and proprioceptive sensations are grossly intact bilaterally.  Musculoskeletal: All active and passive ankle, subtalar, midtarsal, and 1st MPJ range of motion are grossly intact without pain or crepitus, with the exception of none. Manual muscle strength is within normal limits bilaterally. All dorsiflexors, plantarflexors, invertors, evertors are intact bilaterally. Tenderness present to the plantar medial aspect of the right heel on palpation.  No tenderness to the right foot or ankle with range of motion. Calf is soft/non-tender without warmth/induration    Imaging:         No results found.        Gregory Richardson; SANTANAM  [facility] Foot & Ankle Surgery/Podiatry

## 2021-06-12 NOTE — PROGRESS NOTES
HISTORY OF PRESENT ILLNESS  Patient reports that she was nystatin rinse for 10 days.  Her tongue was cultured and there was no yeast found.  She has a metallic and burning sensation in the tongue.  Her white blood count was high.  She reports that her mouth burns as well.  No fever, sweats or chills.  She describes a white coating on the tongue.      REVIEW OF SYSTEMS  Review of Systems: a 10-system review was performed. Pertinent positives are noted in the HPI and on a separate scanned document in the chart.    PMH, PSH, FH and SH has documented in the EHR.      EXAM    CONSTITUTIONAL  General Appearance:  Normal, well developed, well nourished, no obvious distress  Ability to Communicate:  communicates appropriately.    HEAD AND FACE  Appearance and Symmetry:  Normal, no scalp or facial scarring or suspicious lesions.  Paranasal sinuses tenderness:  Normal, Paranasal sinuses non tender    EARS  Clinical speech reception threshold:  Normal, able to hear normal speech.  Auricle:  Normal, Auricles without scars, lesions, masses.  External auditory canal:  Normal, External auditory canal normal.  Tympanic membrane:  Normal, Tympanic membranes normal without swelling or erythema.  Tympanic membrane mobility:  Normal, Normal tympanic membrane mobility.    NOSE (speculum or scope)  Architecture:  Normal, Grossly normal external nasal architecture with no masses or lesions.  Mucosa:  Normal mucosa, No polyps or masses.  Septum:  Normal, Septum non-obstructing.  Turbinates:  Normal, No turbinate abnormalities    ORAL CAVITY AND OROPHARYNX  Lips:  Normal.  Dental and gingiva:  Normal, No obvious dental or gingival disease.  Mucosa:  Normal, Moist mucous membranes.  Tongue:  Normal, Tongue mobile with no mucosal abnormalities  Hard and soft palate:  Normal, Hard and soft palate without cleft or mucosal lesions.  Oral pharynx:  Normal, Posterior pharynx without lesions or remarkable asymmetry.  Saliva:  Normal, Clear  saliva.  Masses:  Normal, No palpable masses or pathologically enlarged lymph nodes.    NECK  Masses/lymph nodes:  Normal, No worrisome neck masses or lymph nodes.  Salivary glands:  Normal, Parotid and submandibular glands.  Trachea and larynx position:  Normal, Trachea and larynx midline.  Thyroid:  Normal, No thyroid abnormality.  Tenderness:  Normal, No cervical tenderness.  Suppleness:  Normal, Neck supple    NEUROLOGICAL  Speech pattern:  Normal, Proasaic    RESPIRATORY  Symmetry and Respiratory effort:  Normal, Symmetric chest movement and expansion with no increased intercostal retractions or use of accessory muscles.     IMPRESSION  Symptoms consistent with burning tongue syndrome.      RECOMMENDATION  I discussed burning tongue syndrome with the patient using online resources.  I answered her questions.  I discussed treatment options.  She expressed understanding.  She will follow up as needed.    Yifan Schwab MD

## 2021-06-12 NOTE — PROGRESS NOTES
Assessment and Plan:     1. Thrush  It appears as though the thrush has resolved.  Will obtain yeast culture for further evaluation.  Patient is also concerned of an underlying reason for the thrush.  Will check hemogram, CMP, hemoglobin A1c and HIV.  If oral pain persists and labs are normal, will refer to ENT for further evaluation.  - Culture, Yeast  - HM2(CBC w/o Differential)  - Comprehensive Metabolic Panel  - Glycosylated Hemoglobin A1c  - HIV Antigen/Antibody Screening [place]  - Ambulatory referral to ENT    2. Hyperlipidemia LDL goal < 70  We will check lipid cascade and notify patient.  She continues pravastatin.  She is content with the plan.  - Lipid Cascade  I spent 25 minutes with the patient with greater than 50% spent discussing symptoms, treatment options, and coordination of care.       Subjective:     Tali is a 52 y.o. female presenting to the clinic for concerns of ongoing thrush.  Patient was evaluated on 7/14/17 where she had been experiencing a sore throat for 1 week.  She has not noticed white spots within her mouth.  She had a burning sensation on her tongue.  She recently had oral sex with a new partner.  Chlamydia testing was negative.  She was treated with liquid nystatin.  Patient has found improvement.  She continues to experience a burning sensation on the tongue.  She has been performing salt water gargles.  She is unsure of how she obtained the thrush.  She is concerned of HIV and would like testing today.  She does wear a mouthguard for TMJ.  She is concerned for possible cancer as multiple family members have cancer.  She denies unintentional weight loss and fatigue. She admits that she is paranoid of the situation. She is fasting today and would like to have her cholesterol checked.      Review of Systems: A complete 14 point review of systems was obtained and is negative or as stated in the history of present illness.    Social History     Social History     Marital status:  "     Spouse name: N/A     Number of children: N/A     Years of education: N/A     Occupational History     Not on file.     Social History Main Topics     Smoking status: Never Smoker     Smokeless tobacco: Never Used     Alcohol use Not on file     Drug use: Not on file     Sexual activity: Not on file     Other Topics Concern     Not on file     Social History Narrative       Active Ambulatory Problems     Diagnosis Date Noted     Vitamin D Deficiency      Limb Pain      Rosacea      Vertigo      Basal Cell Carcinoma Of The Skin      CAD (coronary artery disease), native coronary artery 10/29/2015     Hyperlipidemia LDL goal < 70 10/29/2015     Colon polyp 05/18/2016     Overweight (BMI 25.0-29.9) 03/02/2017     Resolved Ambulatory Problems     Diagnosis Date Noted     Obesity      Past Medical History:   Diagnosis Date     Coronary artery calcification seen on CT scan      Hyperlipidemia      Overweight (BMI 25.0-29.9)        Family History   Problem Relation Age of Onset     Colon cancer Maternal Grandfather 65     Macular degeneration Maternal Grandfather      Stomach cancer Mother 59     Coronary artery disease Father      Parkinsonism Father      Pancreatic cancer Father 59     Stroke Maternal Grandmother      Parkinsonism Paternal Grandfather      Heart attack Paternal Uncle      ALS Paternal Aunt      Multiple sclerosis Other 35       Objective:     /70 (Patient Site: Left Arm, Patient Position: Sitting, Cuff Size: Adult Regular)  Pulse 82  Ht 5' 6\" (1.676 m)  Wt 186 lb 4.8 oz (84.5 kg)  SpO2 98%  BMI 30.07 kg/m2    Patient is alert, in no obvious distress.   Skin: Warm, dry.  No lesions or rashes.  Skin turgor rapid return.   HEENT:  Head normocephalic, atraumatic.  Eyes normal. Ears normal.  Nose patent, mucosa pink.  Oropharynx mucosa pink.  No lesions or tonsillar enlargement.   Neck: Supple, no lymphadenopathy. No thyromegaly.  Lungs:  Clear to auscultation. Respirations even and " unlabored.  No wheezing or rales noted.   Heart:  Regular rate and rhythm.  No murmurs.  Abdomen: Soft, nontender.  No organomegaly. Bowel sounds normoactive. No guarding or masses noted.

## 2021-06-12 NOTE — PATIENT INSTRUCTIONS - HE
What are Prescription Custom Orthotics?  Custom orthotics are specially-made devices designed to support and comfort your feet. Prescription orthotics are crafted for you and no one else. They match the contours of your feet precisely and are designed for the way you move. Orthotics are only manufactured after a podiatrist has conducted a complete evaluation of your feet, ankles, and legs, so the orthotic can accommodate your unique foot structure and pathology.  Prescription orthotics are divided into two categories:    Functional orthotics are designed to control abnormal motion. They may be used to treat foot pain caused by abnormal motion; they can also be used to treat injuries such as shin splints or tendinitis. Functional orthotics are usually crafted of a semi-rigid material such as plastic or graphite.    Accommodative orthotics are softer and meant to provide additional cushioning and support. They can be used to treat diabetic foot ulcers, painful calluses on the bottom of the foot, and other uncomfortable conditions.  Podiatrists use orthotics to treat foot problems such as plantar fasciitis, bursitis, tendinitis, diabetic foot ulcers, and foot, ankle, and heel pain. Clinical research studies have shown that podiatrist-prescribed foot orthotics decrease foot pain and improve function.  Orthotics typically cost more than shoe inserts purchased in a retail store, but the additional cost is usually well worth it. Unlike shoe inserts, orthotics are molded to fit each individual foot, so you can be sure that your orthotics fit and do what they're supposed to do. Prescription orthotics are also made of top-notch materials and last many years when cared for properly. Insurance often helps pay for prescription orthotics.  What are Shoe Inserts?   You've seen them at the grocery store and at the mall. You've probably even seen them on TV and online. Shoe inserts are any kind of non-prescription foot support  designed to be worn inside a shoe. Pre-packaged, mass produced, arch supports are shoe inserts. So are the  custom-made  insoles and foot supports that you can order online or at retail stores. Unless the device has been prescribed by a doctor and crafted for your specific foot, it's a shoe insert, not a custom orthotic device--despite what the ads might say.  Shoe inserts can be very helpful for a variety of foot ailments, including flat arches and foot and leg pain. They can cushion your feet, provide comfort, and support your arches, but they can't correct biomechanical foot problems or cure long-standing foot issues.  The most common types of shoe inserts are:    Arch supports: Some people have high arches. Others have low arches or flat feet. Arch supports generally have a  bumped-up  appearance and are designed to support the foot's natural arch.     Insoles: Insoles slip into your shoe to provide extra cushioning and support. Insoles are often made of gel, foam, or plastic.     Heel liners: Heel liners, sometimes called heel pads or heel cups, provide extra cushioning in the heel region. They may be especially useful for patients who have foot pain caused by age-related thinning of the heels' natural fat pads.     Foot cushions: Do your shoes rub against your heel or your toes? Foot cushions come in many different shapes and sizes and can be used as a barrier between you and your shoe.  Choosing an Over-the-Counter Shoe Insert  Selecting a shoe insert from the wide variety of devices on the market can be overwhelming. Here are some podiatrist-tested tips to help you find the insert that best meets your needs:    Consider your health. Do you have diabetes? Problems with circulation? An over-the-counter insert may not be your best bet. Diabetes and poor circulation increase your risk of foot ulcers and infections, so schedule an appointment with a podiatrist. He or she can help you select a solution that won't  cause additional health problems.     Think about the purpose. Are you planning to run a marathon, or do you just need a little arch support in your work shoes? Look for a product that fits your planned level of activity.     Bring your shoes. For the insert to be effective, it has to fit into your shoes. So bring your sneakers, dress shoes, or work boots--whatever you plan to wear with your insert. Look for an insert that will fit the contours of your shoe.     Try them on. If all possible, slip the insert into your shoe and try it out. Walk around a little. How does it feel? Don't assume that feelings of pressure will go away with continued wear. (If you can't try the inserts at the store, ask about the store's return policy and hold on to your receipt.)    Please call one of the [place] locations below to schedule an appointment. If you received a prescription please bring it with you to your appointment. Some locations are limited to what they carry.    Office Locations    [facility]  [address] [place] [address]  Home Medical Equipment, [address]   Phone: [phone]   Orthotics and Prosthetics, [address]   Phone: [phone]    [facility]  Home Medical Equipment  [address], [address], [place] [address]   Phone: [phone]    Orthotics and Prosthetics ([facility])    [address], [address], [place] [address]  Phone: [phone]    [facility]  [address]   [place] [address]   Phone: [phone]    [facility]  [address]  [place] [address]  Phone: [phone]    [facility] Bld[facility]   [address]  [place] [address]  Phone: [phone]    [facility]  [address]  [place] [address]  Phone: [phone]    [facility]  [address]  [place] [address]  Phone: [phone]    [address] [place] [address]   Phone: [phone]

## 2021-06-14 NOTE — PROGRESS NOTES
Assessment and Plan:     1. Acute cystitis with hematuria  nitrofurantoin, macrocrystal-monohydrate, (MACROBID) 100 MG capsule    Culture, Urine   2. Dysuria  Urinalysis-UC if Indicated    Wet Prep, Vaginal   3. Paresthesia  Ambulatory referral to Neurology    Ambulatory referral to Rheumatology   4. Polyarthralgia  Ambulatory referral to Rheumatology   5. Bilateral hand pain  Ambulatory referral to Rheumatology     Preliminary urinalysis is consistent with a urinary tract infection.  Will treat with Macrobid 100 mg twice daily for 3 days.  Educated on its indications and side effects.  Will check urine culture and notify patient of results.  Wet prep is normal.  Patient continues to experience polyarthralgias and worsening paresthesias.  She is now developing bilateral hand pain.  Due to concerns of neuropathy, will refer to neurology for further evaluation.  Patient may be interested in seeing another rheumatologist for a second opinion.  Referral placed.  She is content with the plan.    Subjective:     Tali is a 55 y.o. female presenting to the clinic for multiple concerns today.  Patient states 3 weeks ago, she noticed that her urine was darker in color.  She was urinating less and not completely emptying her bladder.  She increased her fluid intake.  She complains of urinary urgency.  She has had an itching and burning sensation with urination.  She denies vaginal discharge.  She has not noticed a foul odor.  She has not tried using any over-the-counter products for symptoms.  She denies history of kidney stones.  She is  with no concerns for STDs.  Patient continues to experience bilateral hands pain and paresthesias.  I reviewed the notes from her rheumatologist 6 months ago where she had autoimmune labs performed which were negative including protein electrophoresis, cryoglobulin, C-reactive protein, CK, antineutrophil cytoplasmic antibody, Lyme's, AMERICA and RA.  Patient states that she has noticed  the tingling sensation within her bilateral upper and lower extremities and scalp.  The pain in her hands have worsened over the past 1-1/2 months.  She has difficulty using her hands in the morning.    Review of Systems: A complete 14 point review of systems was obtained and is negative or as stated in the history of present illness.    Social History     Socioeconomic History     Marital status:      Spouse name: Not on file     Number of children: Not on file     Years of education: Not on file     Highest education level: Not on file   Occupational History     Not on file   Social Needs     Financial resource strain: Not on file     Food insecurity     Worry: Not on file     Inability: Not on file     Transportation needs     Medical: Not on file     Non-medical: Not on file   Tobacco Use     Smoking status: Never Smoker     Smokeless tobacco: Never Used   Substance and Sexual Activity     Alcohol use: Yes     Alcohol/week: 10.0 standard drinks     Types: 5 Glasses of wine, 5 Cans of beer per week     Drug use: No     Sexual activity: Not on file     Comment:     Lifestyle     Physical activity     Days per week: Not on file     Minutes per session: Not on file     Stress: Not on file   Relationships     Social connections     Talks on phone: Not on file     Gets together: Not on file     Attends [employer] service: Not on file     Active member of club or organization: Not on file     Attends meetings of clubs or organizations: Not on file     Relationship status: Not on file     Intimate partner violence     Fear of current or ex partner: Not on file     Emotionally abused: Not on file     Physically abused: Not on file     Forced sexual activity: Not on file   Other Topics Concern     Not on file   Social History Narrative     Not on file       Active Ambulatory Problems     Diagnosis Date Noted     Vitamin D Deficiency      Limb Pain      Rosacea      Vertigo      Basal Cell Carcinoma Of The  Skin      CAD (coronary artery disease), native coronary artery 10/29/2015     Hyperlipidemia LDL goal < 70 10/29/2015     Colon polyp 05/18/2016     Overweight (BMI 25.0-29.9) 03/02/2017     Atypical nevus of groin 05/01/2013     Resolved Ambulatory Problems     Diagnosis Date Noted     Obesity      Past Medical History:   Diagnosis Date     Coronary artery calcification seen on CT scan      Hyperlipidemia      Overweight (BMI 25.0-29.9)        Family History   Problem Relation Age of Onset     Colon cancer Maternal Grandfather 65     Macular degeneration Maternal Grandfather      Stomach cancer Mother 59     Coronary artery disease Father      Parkinsonism Father      Pancreatic cancer Father 59     Stroke Maternal Grandmother      Parkinsonism Paternal Grandfather      Heart attack Paternal Uncle      ALS Paternal Aunt      Multiple sclerosis Other 35     Parkinsonism Cousin      Breast cancer Cousin      Heart disease Neg Hx        Objective:     /70 (Patient Site: Right Arm, Patient Position: Sitting, Cuff Size: Adult Regular)   Pulse 60   Temp 97.7  F (36.5  C)   Wt 196 lb (88.9 kg)   LMP 08/01/2018   BMI 31.64 kg/m      Patient is alert, in no obvious distress.   Skin: Warm, dry.    Lungs:  Clear to auscultation. Respirations even and unlabored.  No wheezing or rales noted.   Heart:  Regular rate and rhythm.  No murmurs, S3, S4, gallops, or rubs.    Abdomen: Soft, nontender.  No organomegaly. Bowel sounds normoactive. No guarding or masses noted.   :  External genitalia normal.  Normal vaginal mucosa.  No vaginal discharge is present.   Musculoskeletal:  Full ROM of extremities.    Neurological: No obvious gross or fine motor deficits.    LABORATORY: Urinalysis ordered showing trace blood and moderate leukocytes.  Wet prep ordered and is normal.

## 2021-06-15 ENCOUNTER — OFFICE VISIT - HEALTHEAST (OUTPATIENT)
Dept: FAMILY MEDICINE | Facility: CLINIC | Age: 56
End: 2021-06-15

## 2021-06-15 DIAGNOSIS — R39.15 URINARY URGENCY: ICD-10-CM

## 2021-06-15 DIAGNOSIS — R10.2 PELVIC PRESSURE IN FEMALE: ICD-10-CM

## 2021-06-15 DIAGNOSIS — E78.5 HYPERLIPIDEMIA WITH TARGET LDL LESS THAN 70: ICD-10-CM

## 2021-06-15 DIAGNOSIS — R19.5 PALE STOOL: ICD-10-CM

## 2021-06-15 DIAGNOSIS — R30.0 DYSURIA: ICD-10-CM

## 2021-06-15 PROBLEM — E66.3 OVERWEIGHT: Status: ACTIVE | Noted: 2017-03-02

## 2021-06-15 LAB
ALBUMIN SERPL-MCNC: 4.3 G/DL (ref 3.5–5)
ALBUMIN UR-MCNC: NEGATIVE G/DL
ALP SERPL-CCNC: 78 U/L (ref 45–120)
ALT SERPL W P-5'-P-CCNC: 12 U/L (ref 0–45)
ANION GAP SERPL CALCULATED.3IONS-SCNC: 14 MMOL/L (ref 5–18)
APPEARANCE UR: CLEAR
AST SERPL W P-5'-P-CCNC: 20 U/L (ref 0–40)
BILIRUB SERPL-MCNC: 0.7 MG/DL (ref 0–1)
BILIRUB UR QL STRIP: NEGATIVE
BUN SERPL-MCNC: 14 MG/DL (ref 8–22)
CALCIUM SERPL-MCNC: 9 MG/DL (ref 8.5–10.5)
CHLORIDE BLD-SCNC: 103 MMOL/L (ref 98–107)
CHOLEST SERPL-MCNC: 156 MG/DL
CO2 SERPL-SCNC: 22 MMOL/L (ref 22–31)
COLOR UR AUTO: YELLOW
CREAT SERPL-MCNC: 0.77 MG/DL (ref 0.6–1.1)
ERYTHROCYTE [DISTWIDTH] IN BLOOD BY AUTOMATED COUNT: 12.2 % (ref 11–14.5)
FASTING STATUS PATIENT QL REPORTED: NORMAL
GFR SERPL CREATININE-BSD FRML MDRD: >60 ML/MIN/1.73M2
GLUCOSE BLD-MCNC: 81 MG/DL (ref 70–125)
GLUCOSE UR STRIP-MCNC: NEGATIVE MG/DL
HCT VFR BLD AUTO: 41.1 % (ref 35–47)
HDLC SERPL-MCNC: 70 MG/DL
HGB BLD-MCNC: 13.5 G/DL (ref 12–16)
HGB UR QL STRIP: NEGATIVE
KETONES UR STRIP-MCNC: NEGATIVE MG/DL
LDLC SERPL CALC-MCNC: 75 MG/DL
LEUKOCYTE ESTERASE UR QL STRIP: NEGATIVE
LIPASE SERPL-CCNC: 32 U/L (ref 0–52)
MCH RBC QN AUTO: 33.3 PG (ref 27–34)
MCHC RBC AUTO-ENTMCNC: 32.8 G/DL (ref 32–36)
MCV RBC AUTO: 101 FL (ref 80–100)
NITRATE UR QL: NEGATIVE
PH UR STRIP: 7 [PH] (ref 5–8)
PLATELET # BLD AUTO: 195 THOU/UL (ref 140–440)
PMV BLD AUTO: 11.3 FL (ref 7–10)
POTASSIUM BLD-SCNC: 4.2 MMOL/L (ref 3.5–5)
PROT SERPL-MCNC: 6.6 G/DL (ref 6–8)
RBC # BLD AUTO: 4.06 MILL/UL (ref 3.8–5.4)
SODIUM SERPL-SCNC: 139 MMOL/L (ref 136–145)
SP GR UR STRIP: 1.02 (ref 1–1.03)
TRIGL SERPL-MCNC: 55 MG/DL
UROBILINOGEN UR STRIP-ACNC: NORMAL
WBC: 7.7 THOU/UL (ref 4–11)

## 2021-06-16 ENCOUNTER — COMMUNICATION - HEALTHEAST (OUTPATIENT)
Dept: FAMILY MEDICINE | Facility: CLINIC | Age: 56
End: 2021-06-16

## 2021-06-17 ENCOUNTER — HOSPITAL ENCOUNTER (OUTPATIENT)
Dept: ULTRASOUND IMAGING | Facility: CLINIC | Age: 56
Discharge: HOME | End: 2021-06-17
Attending: NURSE PRACTITIONER
Payer: COMMERCIAL

## 2021-06-17 DIAGNOSIS — R19.5 PALE STOOL: ICD-10-CM

## 2021-06-17 NOTE — TELEPHONE ENCOUNTER
Refill Approved    Rx renewed per Medication Renewal Policy. Medication was last renewed on 06/04/2020.  Last office visit was 01/14/2021 by PCP.    Linda Blanton, [facility] Triage/Med Refill 5/15/2021     Requested Prescriptions   Pending Prescriptions Disp Refills     pravastatin (PRAVACHOL) 80 MG tablet [Pharmacy Med Name: PRAVASTATIN 80MG TABLETS] 90 tablet 3     Sig: TAKE 1 TABLET BY MOUTH EVERY EVENING       Statins Refill Protocol (Hmg CoA Reductase Inhibitors) Passed - 5/15/2021  8:00 AM        Passed - PCP or prescribing provider visit in past 12 months      Last office visit with prescriber/PCP: 1/14/2021 Cherry Mccauley CNP OR same dept: 1/14/2021 Cherry Mccauley CNP OR same specialty: 1/14/2021 Cherry Mccauley CNP  Last physical: 7/9/2020 Last MTM visit: Visit date not found   Next visit within 3 mo: Visit date not found  Next physical within 3 mo: Visit date not found  Prescriber OR PCP: Cherry Mccauley CNP  Last diagnosis associated with med order: 1. Hyperlipidemia LDL goal <70  - pravastatin (PRAVACHOL) 80 MG tablet [Pharmacy Med Name: PRAVASTATIN 80MG TABLETS]; TAKE 1 TABLET BY MOUTH EVERY EVENING  Dispense: 90 tablet; Refill: 3    If protocol passes may refill for 12 months if within 3 months of last provider visit (or a total of 15 months).

## 2021-06-18 NOTE — LETTER
Letter by Cyndi Chew MD at      Author: Cyndi Chew MD Service: -- Author Type: --    Filed:  Encounter Date: 1/16/2019 Status: (Other)       Tali Kumar  [address] [address]              January 16, 2019      Dear Tali,    This is a courtesy reminder that you are due for your two year follow up appointment at [facility] with Dr. Chew in March 2019.     Please call our appointment scheduling line is [phone] as soon as you receive this letter. We look forward to hearing from you. If youve made an appointment prior to receiving this letter, please disregard this reminder.          Sincerely,   The Physicians and Staff of [facility]

## 2021-06-19 NOTE — PROGRESS NOTES
Assessment and Plan:    1. Routine general medical examination at a health care facility  Discussed consuming a healthy diet and exercising.  Discussed importance routine sunscreen use.  Discussed adequate calcium and vitamin D intake.  She is up-to-date on vaccinations.  - Hemoglobin  - Gynecologic Cytology (PAP Smear)    2. Obesity  The following high BMI interventions were performed this visit: dietary needs education, exercise promotion: strength training and exercise promotion: stretching    3. Coronary artery disease involving native coronary artery of native heart without angina pectoris  Patient has a history of an abnormal coronary calcium score.  She sees cardiology every 2 years.  This is stable.  Will notify patient of lipid cascade results and adjust pravastatin accordingly.  - Lipid Cascade    4. Hyperlipidemia LDL goal < 70  Will notify patient of lipid cascade results and adjust pravastatin accordingly.  These have been well controlled.  - Lipid Cascade    5. Medication management  - Comprehensive Metabolic Panel    6. Dark urine  Urinalysis shows small leukocytes.  We discussed these findings and she opted to wait for urine culture prior to treatment for possible urinary tract infection.  She is to increase her fluid intake.  Discussed symptomatic treatment with over-the-counter Tylenol or ibuprofen.  She is content with the plan.  - Urinalysis-UC if Indicated      Subjective:     Tali is a 53 y.o. female presenting to the clinic for a female physical.     LMP: 7/13/18  Hx of abnormal pap smear: none   Last pap smear: 4/2/15 normal  Perform self-breast exams: yes, feeling heavy   Vaginal discharge or irritation: none   Sexually active: yes,    Contraception: none   Concerns for STDs: none   Previous pregnancies:none     Patient had an abnormal coronary calcium score on 10/16/14.  Score was 79.  Patient is taking pravastatin 40 mg daily.  Last cholesterol check was on 7/28/70 with a total  cholesterol 151, triglycerides 48, LDL 73, HDL 68.  She denies chest pain, shortness of breath with exertion, edema, orthopnea, syncope.  She does consume alcohol approximately 10 drinks per week and is concerned of her liver enzymes.  She would like these checked today.    She complains of cloudy urine for 2 months.  She has noticed that her urine is darker.  She denies dysuria, urinary urgency, urinary frequency, hematuria.  She has felt more bloated but admits to constipation.  She denies any blood or mucus in her stool.  She is concerned of a possible urinary tract infection.    Review of systems:  I performed a 10 point review of systems.  All pertinent positives and negatives are noted in the HPI. All others are negative.     No Known Allergies    Current Outpatient Prescriptions on File Prior to Visit   Medication Sig Dispense Refill     COQ10, UBIQUINOL, ORAL Take by mouth. CoQ10 with Krill Oil 600 mg.       pravastatin (PRAVACHOL) 40 MG tablet Take 1 tablet (40 mg total) by mouth at bedtime. TAKE 1 TABLET BY MOUTH AT BEDTIME. Due for follow up with Dr. Chew 90 tablet 3     No current facility-administered medications on file prior to visit.        Social History     Social History     Marital status:      Spouse name: N/A     Number of children: N/A     Years of education: N/A     Occupational History     Not on file.     Social History Main Topics     Smoking status: Never Smoker     Smokeless tobacco: Never Used     Alcohol use Not on file     Drug use: Not on file     Sexual activity: Not on file     Other Topics Concern     Not on file     Social History Narrative       Past Medical History:   Diagnosis Date     Coronary artery calcification seen on CT scan      Hyperlipidemia      Overweight (BMI 25.0-29.9)        Family History   Problem Relation Age of Onset     Colon cancer Maternal Grandfather 65     Macular degeneration Maternal Grandfather      Stomach cancer Mother 59     Coronary  artery disease Father      Parkinsonism Father      Pancreatic cancer Father 59     Stroke Maternal Grandmother      Parkinsonism Paternal Grandfather      Heart attack Paternal Uncle      ALS Paternal Aunt      Multiple sclerosis Other 35       Past Surgical History:   Procedure Laterality Date     SD PROBE NASOLAC DUCT,INSERT TUBE/STENT      Description: Probing Of Nasolacrimal Duct With Insertion Of Tube;  Recorded: 05/21/2012;       Objective:     Vitals:    07/24/18 0704   BP: 120/78   Pulse: 72       Patient is alert, no obvious distress.   Skin: Warm, dry.  No rashes or lesions. Skin turgor rapid return.   HEENT:  Eyes normal.  Ears normal.  Nose patent, mucosa pink.  Oropharynx mucosa pink, no lesions or tonsil enlargement.   Neck:  Supple, without lymphadenopathy, bruits, JVD. Thyroid normal texture and size.    Lungs:  Clear to auscultation.  No wheezing, rales noted.  Respirations even and unlabored.   Heart:  Regular rate and rhythm.  No murmurs.   Breasts:  Normal.  No surrounding adenopathy.   Abdomen: Soft, nontender.  No organomegaly.  Bowel sounds normoactive.  No guarding or masses noted.   :  External genitalia normal.  Normal vaginal mucosa.  Cervix small amount of erythema surronding the cervical os. Cervical motion tenderness. Uterus normal size, no masses.  Adnexa no masses palpated bilaterally.   Musculoskeletal:  Full ROM of extremities.  Muscle strength equal +5/5.   Neurological:  Cranial nerves 2-12 intact.

## 2021-06-19 NOTE — LETTER
Letter by Cherry Mccauley CNP at      Author: Cherry Mccauley CNP Service: -- Author Type: --    Filed:  Encounter Date: 4/7/2019 Status: (Other)         Tali KODI Stacy  [address] [facility] [id number]             April 7, 2019         Dear MsDarcie Stacy,    Below are the results from your recent visit:    Resulted Orders   Lipid Cascade   Result Value Ref Range    Cholesterol 155 <=199 mg/dL    Triglycerides 66 <=149 mg/dL    HDL Cholesterol 79 >=50 mg/dL    LDL Calculated 63 <=129 mg/dL    Patient Fasting > 8hrs? Yes        Your cholesterol results are normal.     Please call with questions or contact us using [email or web address].    Sincerely,        Electronically signed by Cherry Mccauley CNP

## 2021-06-20 NOTE — PROGRESS NOTES
Assessment and Plan:     1. Menorrhagia with irregular cycle  Will obtain pelvic ultrasound to rule out uterine fibroids and endometrial hyperplasia.  Further plans pending the results.    - US Pelvis With Transvaginal Non OB; Future    2. Abdominal bloating  Will obtain pelvic ultrasound.  Constipation is likely contributing.    - US Pelvis With Transvaginal Non OB; Future    3. Constipation, unspecified constipation type  Discussed symptomatic treatment.  Patient is to increase fluid and fiber intake.  Discussed daily Miralax to soften the stools.  Will refer to gastroenterology if symptoms persist or worsen. She is content with the plan.     Subjective:     Tali is a 53 y.o. female presenting to the clinic for concerns for lower abdominal pressure and bloating.  This developed one month ago.  She has felt this on a daily basis.  She has a history of constipation with her last bowel movement being on Friday.  She occasionally has to take a laxative to have a bowel movement.  She denies any blood or mucus in the stool.  She she does strain to have bowel movements.  She does find some relief with the pressure when she has a bowel movement.  Patient has moments where she does not feel as though she does not fully empty her bladder.  She denies dyspepsia, nausea, vomiting, heartburn, diarrhea.  Patient's last menstrual period was on August 1.  Her periods are irregular lasting 5-7 days occurring every 2-3 months.  She does have heavy bleeding with large clots.    Review of Systems: A complete 14 point review of systems was obtained and is negative or as stated in the history of present illness.    Social History     Social History     Marital status:      Spouse name: N/A     Number of children: N/A     Years of education: N/A     Occupational History     Not on file.     Social History Main Topics     Smoking status: Never Smoker     Smokeless tobacco: Never Used     Alcohol use 6.0 oz/week     5 Glasses of  "wine, 5 Cans of beer per week     Drug use: No     Sexual activity: Not on file      Comment:       Other Topics Concern     Not on file     Social History Narrative       Active Ambulatory Problems     Diagnosis Date Noted     Vitamin D Deficiency      Limb Pain      Rosacea      Vertigo      Basal Cell Carcinoma Of The Skin      CAD (coronary artery disease), native coronary artery 10/29/2015     Hyperlipidemia LDL goal < 70 10/29/2015     Colon polyp 05/18/2016     Overweight (BMI 25.0-29.9) 03/02/2017     Resolved Ambulatory Problems     Diagnosis Date Noted     Obesity      Past Medical History:   Diagnosis Date     Coronary artery calcification seen on CT scan      Hyperlipidemia      Overweight (BMI 25.0-29.9)        Family History   Problem Relation Age of Onset     Colon cancer Maternal Grandfather 65     Macular degeneration Maternal Grandfather      Stomach cancer Mother 59     Coronary artery disease Father      Parkinsonism Father      Pancreatic cancer Father 59     Stroke Maternal Grandmother      Parkinsonism Paternal Grandfather      Heart attack Paternal Uncle      ALS Paternal Aunt      Multiple sclerosis Other 35     Heart disease Neg Hx        Objective:     /68  Pulse (!) 54  Ht 5' 6\" (1.676 m)  Wt 189 lb 12.8 oz (86.1 kg)  LMP 08/01/2018  BMI 30.63 kg/m2    Patient is alert, in no obvious distress.   Skin: Warm, dry.    Lungs:  Clear to auscultation. Respirations even and unlabored.  No wheezing or rales noted.   Heart:  Regular rate and rhythm.  No murmurs, S3, S4, gallops, or rubs.    Abdomen: Soft, nontender.  No organomegaly. Bowel sounds normoactive. No guarding or masses noted.               "

## 2021-06-22 ENCOUNTER — AMBULATORY - HEALTHEAST (OUTPATIENT)
Dept: FAMILY MEDICINE | Facility: CLINIC | Age: 56
End: 2021-06-22

## 2021-06-22 DIAGNOSIS — R19.5 PALE STOOL: ICD-10-CM

## 2021-06-23 ENCOUNTER — RECORDS - HEALTHEAST (OUTPATIENT)
Dept: FAMILY MEDICINE | Facility: CLINIC | Age: 56
End: 2021-06-23

## 2021-06-26 NOTE — PROGRESS NOTES
Assessment and Plan:     1. Pelvic pressure in female  Ambulatory referral [facility] - [facility]   2. Urinary urgency  Ambulatory referral to [facility] - [facility]   3. Dysuria  Urinalysis-UC if Indicated   4. Pale stool  Comprehensive Metabolic Panel    Lipase    US Abdomen Limited    HM2(CBC w/o Differential)   5. Hyperlipidemia LDL goal < 70  Lipid Cochran     Patient has been experiencing pelvic pressure, urinary urgency, dysuria.  Urinalysis is unremarkable today.  Patient may have atrophic vaginitis or a cervical or uterine prolapse.  Will refer to urology for further evaluation.  Patient has had a recent episode of a pale stool.  Will check liver and pancreatic enzymes.  Will obtain abdominal ultrasound for further evaluation.  If labs are normal, will refer to gastroenterology.  Patient has a history of hyperlipidemia and is taking pravastatin.  She is fasting today.  Will notify patient of results.  She is content with the plan.    Subjective:     Tali is a 56 y.o. female presenting to the clinic for concerns for urinary tract infection.  Patient was seen in clinic on 1/14/2021 with concerns of dark-colored urine.  She did not feel as though she was emptying her bladder.  Urinalysis was suspicious for urinary tract infection.  She was treated with nitrofurantoin.  Urine culture was positive for group B strep.  Antibiotic was changed to cephalexin.  Patient states she has had dark-colored urine since.  She occasionally has bilateral flank pain and vaginal pressure.  She occasionally feels as though she is wearing a tampon incorrectly despite not wearing a tampon.  She denies vaginal discharge or irritation.  She has had some intermittent menstrual cramping.  Her last menstrual period was in 2018.  She has not had any vaginal bleeding since.  She denies dysuria, but has had urinary urgency.  She occasionally has difficulty starting her urinary stream.  She denies hematuria.  She has  not tried taking any over-the-counter products for her symptoms.  She is concerned as her bowel movement on Saturday was the color of cement.  She had a similar instance in 2018 where she saw gastroenterology was diagnosed with constipation.  She was treated with MiraLAX.    Review of Systems: A complete 14 point review of systems was obtained and is negative or as stated in the history of present illness.    Social History     Socioeconomic History     Marital status:      Spouse name: Not on file     Number of children: Not on file     Years of education: Not on file     Highest education level: Not on file   Occupational History     Not on file   Social Needs     Financial resource strain: Not on file     Food insecurity     Worry: Not on file     Inability: Not on file     Transportation needs     Medical: Not on file     Non-medical: Not on file   Tobacco Use     Smoking status: Never Smoker     Smokeless tobacco: Never Used   Substance and Sexual Activity     Alcohol use: Yes     Alcohol/week: 10.0 standard drinks     Types: 5 Glasses of wine, 5 Cans of beer per week     Drug use: No     Sexual activity: Not on file     Comment:     Lifestyle     Physical activity     Days per week: Not on file     Minutes per session: Not on file     Stress: Not on file   Relationships     Social connections     Talks on phone: Not on file     Gets together: Not on file     Attends [employer] service: Not on file     Active member of club or organization: Not on file     Attends meetings of clubs or organizations: Not on file     Relationship status: Not on file     Intimate partner violence     Fear of current or ex partner: Not on file     Emotionally abused: Not on file     Physically abused: Not on file     Forced sexual activity: Not on file   Other Topics Concern     Not on file   Social History Narrative     Not on file       Active Ambulatory Problems     Diagnosis Date Noted     Vitamin D Deficiency       Limb Pain      Rosacea      Vertigo      Basal Cell Carcinoma Of The Skin      CAD (coronary artery disease), native coronary artery 10/29/2015     Hyperlipidemia LDL goal < 70 10/29/2015     Colon polyp 05/18/2016     Overweight (BMI 25.0-29.9) 03/02/2017     Atypical nevus of groin 05/01/2013     Resolved Ambulatory Problems     Diagnosis Date Noted     Obesity      Past Medical History:   Diagnosis Date     Coronary artery calcification seen on CT scan      Hyperlipidemia      Overweight (BMI 25.0-29.9)        Family History   Problem Relation Age of Onset     Colon cancer Maternal Grandfather 65     Macular degeneration Maternal Grandfather      Stomach cancer Mother 59     Coronary artery disease Father      Parkinsonism Father      Pancreatic cancer Father 59     Stroke Maternal Grandmother      Parkinsonism Paternal Grandfather      Heart attack Paternal Uncle      ALS Paternal Aunt      Multiple sclerosis Other 35     Parkinsonism Cousin      Breast cancer Cousin      Heart disease Neg Hx        Objective:     /78   Pulse 61   Wt 190 lb 9.6 oz (86.5 kg)   LMP 08/01/2018   SpO2 99%   BMI 30.76 kg/m      Patient is alert, in no obvious distress.   Skin: Warm, dry.    Lungs:  Clear to auscultation. Respirations even and unlabored.  No wheezing or rales noted.   Heart:  Regular rate and rhythm.  No murmurs, S3, S4, gallops, or rubs.    Abdomen: Soft, nontender.  No organomegaly. Bowel sounds normoactive. No guarding or masses noted.         LABORATORY: Urinalysis ordered and is unremarkable.

## 2021-07-04 NOTE — LETTER
Letter by Cherry Mccauley CNP at      Author: Cherry Mccauley CNP Service: -- Author Type: --    Filed:  Encounter Date: 6/16/2021 Status: (Other)         Tali Kumar  [address] [facility] [address]             June 16, 2021         Dear Ms. Kumar,    Below are the results from your recent visit:    Resulted Orders   Urinalysis-UC if Indicated   Result Value Ref Range    Color, UA Yellow Colorless, Yellow, Straw, Light Yellow    Clarity, UA Clear Clear    Glucose, UA Negative Negative    Protein, UA Negative Negative    Bilirubin, UA Negative Negative    Urobilinogen, UA 0.2 E.U./dL 0.2 E.U./dL, 1.0 E.U./dL    pH, UA 7.0 5.0 - 8.0    Blood, UA Negative Negative    Ketones, UA Negative Negative    Nitrite, UA Negative Negative    Leukocytes, UA Negative Negative    Specific Gravity, UA 1.020 1.005 - 1.030    Narrative    Microscopic not indicated  UC not indicated   Comprehensive Metabolic Panel   Result Value Ref Range    Sodium 139 136 - 145 mmol/L    Potassium 4.2 3.5 - 5.0 mmol/L    Chloride 103 98 - 107 mmol/L    CO2 22 22 - 31 mmol/L    Anion Gap, Calculation 14 5 - 18 mmol/L    Glucose 81 70 - 125 mg/dL    BUN 14 8 - 22 mg/dL    Creatinine 0.77 0.60 - 1.10 mg/dL    GFR MDRD Af Amer >60 >60 mL/min/1.73m2    GFR MDRD Non Af Amer >60 >60 mL/min/1.73m2    Bilirubin, Total 0.7 0.0 - 1.0 mg/dL    Calcium 9.0 8.5 - 10.5 mg/dL    Protein, Total 6.6 6.0 - 8.0 g/dL    Albumin 4.3 3.5 - 5.0 g/dL    Alkaline Phosphatase 78 45 - 120 U/L    AST 20 0 - 40 U/L    ALT 12 0 - 45 U/L    Narrative    Fasting Glucose reference range is 70-99 mg/dL per  American Diabetes Association (ADA) guidelines.   Lipase   Result Value Ref Range    Lipase 32 0 - 52 U/L   Lipid Cascade   Result Value Ref Range    Cholesterol 156 <=199 mg/dL    Triglycerides 55 <=149 mg/dL    HDL Cholesterol 70 >=50 mg/dL    LDL Calculated 75 <=129 mg/dL    Patient Fasting > 8hrs? Unknown    HM2(CBC w/o Differential)   Result Value Ref Range    WBC 7.7  4.0 - 11.0 thou/uL    RBC 4.06 3.80 - 5.40 mill/uL    Hemoglobin 13.5 12.0 - 16.0 g/dL    Hematocrit 41.1 35.0 - 47.0 %     (H) 80 - 100 fL    MCH 33.3 27.0 - 34.0 pg    MCHC 32.8 32.0 - 36.0 g/dL    RDW 12.2 11.0 - 14.5 %    Platelets 195 140 - 440 thou/uL    MPV 11.3 (H) 7.0 - 10.0 fL       Your lab results show no concerning findings.  Your liver and pancreatic enzymes are normal.      Please call with questions or contact us using [email or web address]    Sincerely,        Electronically signed by Cherry Mccauley CNP

## 2021-07-06 VITALS
DIASTOLIC BLOOD PRESSURE: 78 MMHG | BODY MASS INDEX: 30.76 KG/M2 | HEART RATE: 61 BPM | OXYGEN SATURATION: 99 % | SYSTOLIC BLOOD PRESSURE: 112 MMHG | WEIGHT: 190.6 LBS

## 2021-07-13 ENCOUNTER — RECORDS - HEALTHEAST (OUTPATIENT)
Dept: ADMINISTRATIVE | Facility: CLINIC | Age: 56
End: 2021-07-13

## 2021-07-15 ENCOUNTER — OFFICE VISIT (OUTPATIENT)
Dept: FAMILY MEDICINE | Facility: CLINIC | Age: 56
End: 2021-07-15
Payer: COMMERCIAL

## 2021-07-15 VITALS
WEIGHT: 188.8 LBS | SYSTOLIC BLOOD PRESSURE: 138 MMHG | HEART RATE: 60 BPM | BODY MASS INDEX: 30.34 KG/M2 | DIASTOLIC BLOOD PRESSURE: 62 MMHG | HEIGHT: 66 IN

## 2021-07-15 DIAGNOSIS — E66.811 CLASS 1 OBESITY DUE TO EXCESS CALORIES WITHOUT SERIOUS COMORBIDITY WITH BODY MASS INDEX (BMI) OF 30.0 TO 30.9 IN ADULT: ICD-10-CM

## 2021-07-15 DIAGNOSIS — Z11.59 NEED FOR HEPATITIS C SCREENING TEST: ICD-10-CM

## 2021-07-15 DIAGNOSIS — Z00.00 ROUTINE GENERAL MEDICAL EXAMINATION AT A HEALTH CARE FACILITY: Primary | ICD-10-CM

## 2021-07-15 DIAGNOSIS — I25.10 CORONARY ARTERY DISEASE INVOLVING NATIVE CORONARY ARTERY OF NATIVE HEART WITHOUT ANGINA PECTORIS: ICD-10-CM

## 2021-07-15 DIAGNOSIS — E66.09 CLASS 1 OBESITY DUE TO EXCESS CALORIES WITHOUT SERIOUS COMORBIDITY WITH BODY MASS INDEX (BMI) OF 30.0 TO 30.9 IN ADULT: ICD-10-CM

## 2021-07-15 DIAGNOSIS — E78.5 HYPERLIPIDEMIA WITH TARGET LDL LESS THAN 70: ICD-10-CM

## 2021-07-15 PROCEDURE — 36415 COLL VENOUS BLD VENIPUNCTURE: CPT | Performed by: NURSE PRACTITIONER

## 2021-07-15 PROCEDURE — 99396 PREV VISIT EST AGE 40-64: CPT | Performed by: NURSE PRACTITIONER

## 2021-07-15 PROCEDURE — 86803 HEPATITIS C AB TEST: CPT | Performed by: NURSE PRACTITIONER

## 2021-07-15 ASSESSMENT — MIFFLIN-ST. JEOR: SCORE: 1467.11
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
## 2021-07-16 LAB — HCV AB SERPL QL IA: NEGATIVE

## 2021-07-21 ENCOUNTER — RECORDS - HEALTHEAST (OUTPATIENT)
Dept: ADMINISTRATIVE | Facility: CLINIC | Age: 56
End: 2021-07-21

## 2021-07-26 ENCOUNTER — TRANSFERRED RECORDS (OUTPATIENT)
Dept: HEALTH INFORMATION MANAGEMENT | Facility: CLINIC | Age: 56
End: 2021-07-26

## 2021-10-02 ENCOUNTER — HEALTH MAINTENANCE LETTER (OUTPATIENT)
Age: 56
End: 2021-10-02

## 2021-11-23 ENCOUNTER — OFFICE VISIT (OUTPATIENT)
Dept: CARDIOLOGY | Facility: CLINIC | Age: 56
End: 2021-11-23
Payer: COMMERCIAL

## 2021-11-23 VITALS
WEIGHT: 184 LBS | BODY MASS INDEX: 29.47 KG/M2 | HEART RATE: 68 BPM | DIASTOLIC BLOOD PRESSURE: 82 MMHG | OXYGEN SATURATION: 100 % | RESPIRATION RATE: 16 BRPM | SYSTOLIC BLOOD PRESSURE: 134 MMHG

## 2021-11-23 DIAGNOSIS — E78.5 HYPERLIPIDEMIA WITH TARGET LDL LESS THAN 70: ICD-10-CM

## 2021-11-23 DIAGNOSIS — I25.10 CORONARY ARTERY DISEASE INVOLVING NATIVE CORONARY ARTERY OF NATIVE HEART WITHOUT ANGINA PECTORIS: Primary | ICD-10-CM

## 2021-11-23 DIAGNOSIS — E66.3 OVERWEIGHT: ICD-10-CM

## 2021-11-23 PROCEDURE — 99214 OFFICE O/P EST MOD 30 MIN: CPT | Performed by: INTERNAL MEDICINE

## 2021-11-23 RX ORDER — ROSUVASTATIN CALCIUM 20 MG/1
20 TABLET, COATED ORAL DAILY
Qty: 90 TABLET | Refills: 3 | Status: SHIPPED | OUTPATIENT
Start: 2021-11-23 | End: 2022-11-16

## 2021-11-23 NOTE — LETTER
11/23/2021    Cherry Mccauley, APRN CNP  [address]  [facility] [address]    RE: Tali Kumar       Dear Colleague,    I had the pleasure of seeing Tali Kumar in the [facility].      HEART CARE ENCOUNTER CONSULTATON NOTE      [facility]  [phone]      Assessment/Recommendations   Assessment/Plan:  CAD - Titrate to high intensity statin therapy. Diet and exercise discussed. We reviewed the symptoms to call us with today.    Hyperlipidemia - LDL not at goal on pravastatin 80mg. Will switch to rosuvastatin 20mg and repeat LDL in 3 months.    Risk modification - diet and exercise discussed.    F/U 2 years       History of Present Illness/Subjective    HPI: Tali Kumar is a 56 year old female with extensive family history of cad and atypical chest pain found to have coronary calcification on CT with a normal stress test.     Tali returns for 2 year follow up. She is staying active and trying to eat healthy. She denies any angina, dyspnea, palpitations, dizziness, syncope, pnd/orthopnea, edema. There is rare indigestion not associated with exertion. She notes a friends brother recently was diagnosed with lewy body dementia and the friend wonders if it was related to statin use. We     Physical Examination  Review of Systems   Vitals: /82 (BP Location: Left arm, Patient Position: Sitting, Cuff Size: Adult Regular)   Pulse 68   Resp 16   Wt 83.5 kg (184 lb)   SpO2 100%   BMI 29.47 kg/m    BMI= Body mass index is 29.47 kg/m .  Wt Readings from Last 3 Encounters:   11/23/21 83.5 kg (184 lb)   07/15/21 85.6 kg (188 lb 12.8 oz)   06/15/21 86.5 kg (190 lb 9.6 oz)       General Appearance:   no distress, normal body habitus   ENT/Mouth: membranes moist, no oral lesions or bleeding gums.      EYES:  no scleral icterus, normal conjunctivae   Neck: no carotid bruits or thyromegaly   Chest/Lungs:   lungs are clear to  auscultation, no rales or wheezing, no sternal scar, equal chest wall expansion    Cardiovascular:   Regular. Normal first and second heart sounds with no murmur. No rubs or gallops; the right carotid, radial and posterior tibial pulses are intact and the left carotid, radial and posterior tibial pulses are intact.  Jugular venous pressure is flat, no edema bilaterally    Abdomen:  no organomegaly, masses, bruits, or tenderness; bowel sounds are present   Extremities: no cyanosis or clubbing   Skin: no xanthelasma, warm.    Neurologic: normal  bilateral, no tremors     Psychiatric: alert and oriented x3, calm        Please refer above for cardiac ROS details.        Medical History  Surgical History Family History Social History   Past Medical History:   Diagnosis Date     Coronary artery calcification seen on CT scan      Hyperlipidemia      Overweight (BMI 25.0-29.9)      Past Surgical History:   Procedure Laterality Date     BLADDER NECK RECONSTRUCTION       BUNIONECTOMY Left      [place] PROBE NASOLAC DUCT,INSERT TUBE/STENT      Description: Probing Of Nasolacrimal Duct With Insertion Of Tube;  Recorded: 05/21/2012;     Family History   Problem Relation Age of Onset     Colon Cancer Maternal Grandfather 65.00     Macular Degeneration Maternal Grandfather      Stomach Cancer Mother 59.00     Coronary Artery Disease Father      Parkinsonism Father      Pancreatic Cancer Father 59.00     Cerebrovascular Disease Maternal Grandmother      Parkinsonism Paternal Grandfather      Coronary Artery Disease Paternal Uncle      ALS Paternal Aunt      Multiple Sclerosis Other 35.00     Parkinsonism Cousin      Breast Cancer Cousin      Heart Disease No family hx of         Social History     Socioeconomic History     Marital status:      Spouse name: Not on file     Number of children: Not on file     Years of education: Not on file     Highest education level: Not on file   Occupational History     Not on file    Tobacco Use     Smoking status: Never Smoker     Smokeless tobacco: Never Used   Substance and Sexual Activity     Alcohol use: Yes     Alcohol/week: 10.0 standard drinks     Comment: 10/week      Drug use: No     Sexual activity: Not on file     Comment:     Other Topics Concern     Not on file   Social History Narrative     Not on file     Social Determinants of Health     Financial Resource Strain: Not on file   Food Insecurity: Not on file   Transportation Needs: Not on file   Physical Activity: Not on file   Stress: Not on file   Social Connections: Not on file   Intimate Partner Violence: Not on file   Housing Stability: Not on file           Medications  Allergies   Current Outpatient Medications   Medication Sig Dispense Refill     calcium citrate-vitamin D (CITRACAL+D) 315-200 mg-unit per tablet [CALCIUM CITRATE-VITAMIN D (CITRACAL+D) 315-200 MG-UNIT PER TABLET] Take 1 tablet by mouth daily.       COQ10, UBIQUINOL, ORAL [COQ10, UBIQUINOL, ORAL] Take by mouth.              ibuprofen (ADVIL,MOTRIN) 600 MG tablet [IBUPROFEN (ADVIL,MOTRIN) 600 MG TABLET] TAKE 1 TABLET(600 MG) BY MOUTH THREE TIMES DAILY WITH FOOD FOR 14 DAYS (Patient taking differently: Take by mouth continuous prn ) 42 tablet 0     KRILL OIL ORAL [KRILL OIL ORAL] Take by mouth.       Methylcellulose, Laxative, (CITRUCEL PO) Take by mouth continuous prn       pravastatin (PRAVACHOL) 80 MG tablet [PRAVASTATIN (PRAVACHOL) 80 MG TABLET] TAKE 1 TABLET BY MOUTH EVERY EVENING 90 tablet 2     TURMERIC PO Take by mouth continuous prn       No Known Allergies       Lab Results    Chemistry/lipid CBC Cardiac Enzymes/BNP/TSH/INR   Recent Labs   Lab Test 06/15/21  1601   CHOL 156   HDL 70   LDL 75   TRIG 55     Recent Labs   Lab Test 06/15/21  1601 05/20/20  1202 04/05/19  0710   LDL 75 75 63     Recent Labs   Lab Test 06/15/21  1601      POTASSIUM 4.2   CHLORIDE 103   CO2 22   GLC 81   BUN 14   CR 0.77   GFRESTIMATED >60   LENCHO 9.0     Recent  Labs   Lab Test 06/15/21  1601 05/20/20  1202 04/16/19  1549   CR 0.77 0.74 0.73     Recent Labs   Lab Test 08/30/18  0757 07/28/17  1158 04/07/16  0900   A1C 5.5 5.4 5.3          Recent Labs   Lab Test 06/15/21  1601   WBC 7.7   HGB 13.5   HCT 41.1   *        Recent Labs   Lab Test 06/15/21  1601 05/20/20  1202 04/16/19  1549   HGB 13.5 14.5 13.9    No results for input(s): TROPONINI in the last 42920 hours.  No results for input(s): BNP, NTBNPI, NTBNP in the last 80646 hours.  Recent Labs   Lab Test 07/24/20  0752   TSH 3.55     No results for input(s): INR in the last 42592 hours.         Cyndi Chew MD              Thank you for allowing me to participate in the care of your patient.      Sincerely,     Cyndi Chew MD     [facility]  cc:   No referring provider defined for this encounter.

## 2021-11-23 NOTE — PROGRESS NOTES
HEART CARE ENCOUNTER CONSULTATON NOTE      [facility]  [phone]      Assessment/Recommendations   Assessment/Plan:  CAD - Titrate to high intensity statin therapy. Diet and exercise discussed. We reviewed the symptoms to call us with today.    Hyperlipidemia - LDL not at goal on pravastatin 80mg. Will switch to rosuvastatin 20mg and repeat LDL in 3 months.    Risk modification - diet and exercise discussed.    F/U 2 years       History of Present Illness/Subjective    HPI: Tali Kumar is a 56 year old female with extensive family history of cad and atypical chest pain found to have coronary calcification on CT with a normal stress test.     Tali returns for 2 year follow up. She is staying active and trying to eat healthy. She denies any angina, dyspnea, palpitations, dizziness, syncope, pnd/orthopnea, edema. There is rare indigestion not associated with exertion. She notes a friends brother recently was diagnosed with lewy body dementia and the friend wonders if it was related to statin use. We     Physical Examination  Review of Systems   Vitals: /82 (BP Location: Left arm, Patient Position: Sitting, Cuff Size: Adult Regular)   Pulse 68   Resp 16   Wt 83.5 kg (184 lb)   SpO2 100%   BMI 29.47 kg/m    BMI= Body mass index is 29.47 kg/m .  Wt Readings from Last 3 Encounters:   11/23/21 83.5 kg (184 lb)   07/15/21 85.6 kg (188 lb 12.8 oz)   06/15/21 86.5 kg (190 lb 9.6 oz)       General Appearance:   no distress, normal body habitus   ENT/Mouth: membranes moist, no oral lesions or bleeding gums.      EYES:  no scleral icterus, normal conjunctivae   Neck: no carotid bruits or thyromegaly   Chest/Lungs:   lungs are clear to auscultation, no rales or wheezing, no sternal scar, equal chest wall expansion    Cardiovascular:   Regular. Normal first and second heart sounds with no murmur. No rubs or gallops; the right carotid, radial and posterior tibial pulses are intact and the left  carotid, radial and posterior tibial pulses are intact.  Jugular venous pressure is flat, no edema bilaterally    Abdomen:  no organomegaly, masses, bruits, or tenderness; bowel sounds are present   Extremities: no cyanosis or clubbing   Skin: no xanthelasma, warm.    Neurologic: normal  bilateral, no tremors     Psychiatric: alert and oriented x3, calm        Please refer above for cardiac ROS details.        Medical History  Surgical History Family History Social History   Past Medical History:   Diagnosis Date     Coronary artery calcification seen on CT scan      Hyperlipidemia      Overweight (BMI 25.0-29.9)      Past Surgical History:   Procedure Laterality Date     BLADDER NECK RECONSTRUCTION       BUNIONECTOMY Left      [place] PROBE NASOLAC DUCT,INSERT TUBE/STENT      Description: Probing Of Nasolacrimal Duct With Insertion Of Tube;  Recorded: 05/21/2012;     Family History   Problem Relation Age of Onset     Colon Cancer Maternal Grandfather 65.00     Macular Degeneration Maternal Grandfather      Stomach Cancer Mother 59.00     Coronary Artery Disease Father      Parkinsonism Father      Pancreatic Cancer Father 59.00     Cerebrovascular Disease Maternal Grandmother      Parkinsonism Paternal Grandfather      Coronary Artery Disease Paternal Uncle      ALS Paternal Aunt      Multiple Sclerosis Other 35.00     Parkinsonism Cousin      Breast Cancer Cousin      Heart Disease No family hx of         Social History     Socioeconomic History     Marital status:      Spouse name: Not on file     Number of children: Not on file     Years of education: Not on file     Highest education level: Not on file   Occupational History     Not on file   Tobacco Use     Smoking status: Never Smoker     Smokeless tobacco: Never Used   Substance and Sexual Activity     Alcohol use: Yes     Alcohol/week: 10.0 standard drinks     Comment: 10/week      Drug use: No     Sexual activity: Not on file     Comment:      Other Topics Concern     Not on file   Social History Narrative     Not on file     Social Determinants of Health     Financial Resource Strain: Not on file   Food Insecurity: Not on file   Transportation Needs: Not on file   Physical Activity: Not on file   Stress: Not on file   Social Connections: Not on file   Intimate Partner Violence: Not on file   Housing Stability: Not on file           Medications  Allergies   Current Outpatient Medications   Medication Sig Dispense Refill     calcium citrate-vitamin D (CITRACAL+D) 315-200 mg-unit per tablet [CALCIUM CITRATE-VITAMIN D (CITRACAL+D) 315-200 MG-UNIT PER TABLET] Take 1 tablet by mouth daily.       COQ10, UBIQUINOL, ORAL [COQ10, UBIQUINOL, ORAL] Take by mouth.              ibuprofen (ADVIL,MOTRIN) 600 MG tablet [IBUPROFEN (ADVIL,MOTRIN) 600 MG TABLET] TAKE 1 TABLET(600 MG) BY MOUTH THREE TIMES DAILY WITH FOOD FOR 14 DAYS (Patient taking differently: Take by mouth continuous prn ) 42 tablet 0     KRILL OIL ORAL [KRILL OIL ORAL] Take by mouth.       Methylcellulose, Laxative, (CITRUCEL PO) Take by mouth continuous prn       pravastatin (PRAVACHOL) 80 MG tablet [PRAVASTATIN (PRAVACHOL) 80 MG TABLET] TAKE 1 TABLET BY MOUTH EVERY EVENING 90 tablet 2     TURMERIC PO Take by mouth continuous prn       No Known Allergies       Lab Results    Chemistry/lipid CBC Cardiac Enzymes/BNP/TSH/INR   Recent Labs   Lab Test 06/15/21  1601   CHOL 156   HDL 70   LDL 75   TRIG 55     Recent Labs   Lab Test 06/15/21  1601 05/20/20  1202 04/05/19  0710   LDL 75 75 63     Recent Labs   Lab Test 06/15/21  1601      POTASSIUM 4.2   CHLORIDE 103   CO2 22   GLC 81   BUN 14   CR 0.77   GFRESTIMATED >60   LENCHO 9.0     Recent Labs   Lab Test 06/15/21  1601 05/20/20  1202 04/16/19  1549   CR 0.77 0.74 0.73     Recent Labs   Lab Test 08/30/18  0757 07/28/17  1158 04/07/16  0900   A1C 5.5 5.4 5.3          Recent Labs   Lab Test 06/15/21  1601   WBC 7.7   HGB 13.5   HCT 41.1   *   PLT  195     Recent Labs   Lab Test 06/15/21  1601 05/20/20  1202 04/16/19  1549   HGB 13.5 14.5 13.9    No results for input(s): TROPONINI in the last 12155 hours.  No results for input(s): BNP, NTBNPI, NTBNP in the last 11392 hours.  Recent Labs   Lab Test 07/24/20  0752   TSH 3.55     No results for input(s): INR in the last 78545 hours.         Cyndi Chew MD

## 2021-11-23 NOTE — PATIENT INSTRUCTIONS
It was a pleasure seeing you at [facility] today.        Here are my suggestions for your care:    1.  Switch pravastatin to rosuvastatin, we will repeat a cholesterol in 3 months    2.  Exercise at least 30 minutes daily    3.  Stick to a healthy diet with very limited processed foods, animal fats, and sugars (<30 g/day)    4.  Get your covid booster shot today or tomorrow      Let's meet again in 24 months.    You can always call my nurse Althea Majano RN who is a nurse helping me in the care of my patients. She can be reached at [phone] if you have any questions.    For scheduling, please call my  Sharri Santiago at [phone].    Thank you again for trusting me with your care. Please feel free to call my office at any time if you have any question or if I can assist you in any way.    Cyndi Chew MD  [facility]

## 2021-12-09 ENCOUNTER — HOSPITAL ENCOUNTER (OUTPATIENT)
Dept: MAMMOGRAPHY | Facility: CLINIC | Age: 56
Discharge: HOME | End: 2021-12-09
Attending: NURSE PRACTITIONER | Admitting: NURSE PRACTITIONER
Payer: COMMERCIAL

## 2021-12-09 DIAGNOSIS — Z12.31 VISIT FOR SCREENING MAMMOGRAM: ICD-10-CM

## 2021-12-09 PROCEDURE — 77063 BREAST TOMOSYNTHESIS BI: CPT

## 2022-04-29 ENCOUNTER — TRANSFERRED RECORDS (OUTPATIENT)
Dept: HEALTH INFORMATION MANAGEMENT | Facility: CLINIC | Age: 57
End: 2022-04-29
Payer: COMMERCIAL

## 2022-06-21 ENCOUNTER — OFFICE VISIT (OUTPATIENT)
Dept: URGENT CARE | Facility: URGENT CARE | Age: 57
End: 2022-06-21
Payer: COMMERCIAL

## 2022-06-21 VITALS
DIASTOLIC BLOOD PRESSURE: 78 MMHG | HEART RATE: 78 BPM | SYSTOLIC BLOOD PRESSURE: 140 MMHG | RESPIRATION RATE: 20 BRPM | TEMPERATURE: 98 F | OXYGEN SATURATION: 98 %

## 2022-06-21 DIAGNOSIS — J34.89 SINUS PAIN: ICD-10-CM

## 2022-06-21 DIAGNOSIS — J01.90 ACUTE SINUSITIS WITH SYMPTOMS > 10 DAYS: Primary | ICD-10-CM

## 2022-06-21 PROCEDURE — 99213 OFFICE O/P EST LOW 20 MIN: CPT | Performed by: PHYSICIAN ASSISTANT

## 2022-06-21 RX ORDER — PREDNISONE 20 MG/1
20 TABLET ORAL 2 TIMES DAILY
Qty: 10 TABLET | Refills: 0 | Status: SHIPPED | OUTPATIENT
Start: 2022-06-21 | End: 2022-08-05

## 2022-06-21 NOTE — PROGRESS NOTES
"  Assessment & Plan     Acute sinusitis with symptoms > 10 days    The sinuses are air-filled spaces within the bones of the face. They connect to the inside of the nose. Sinusitis is an inflammation of the tissue that lines the sinuses. Sinusitis can occur during a cold. It can also happen due to allergies to pollens and other particles in the air. Sinusitis can cause symptoms of sinus congestion and a feeling of fullness. A sinus infection causes fever, headache, and facial pain. There is often green or yellow fluid draining from the nose or into the back of the throat (post-nasal drip). You have been given antibiotics to treat this condition.   Home care    Take the full course of antibiotics as instructed. Don't stop taking them, even when you feel better.    Drink plenty of water, hot tea, and other liquids as directed by the healthcare provider. This may help thin nasal mucus. It also may help your sinuses drain fluids.    Heat may help soothe painful areas of your face. Use a towel soaked in hot water. Or,  the shower and direct the warm spray onto your face. Using a vaporizer along with a menthol rub at night may also help soothe symptoms.     An expectorant with guaifenesin may help thin nasal mucus and help your sinuses drain fluids. Talk with your provider or pharmacists before taking an over-the-counter (OTC) medicine if you have any questions about it or its side effects..      - predniSONE (DELTASONE) 20 MG tablet; Take 1 tablet (20 mg) by mouth 2 times daily    Sinus pain    - amoxicillin-clavulanate (AUGMENTIN) 875-125 MG tablet; Take 1 tablet by mouth 2 times daily for 10 days       BMI:   Estimated body mass index is 29.47 kg/m  as calculated from the following:    Height as of 7/15/21: 1.683 m (5' 6.25\").    Weight as of 11/23/21: 83.5 kg (184 lb).       At today's visit with Tali Kumar , we discussed results, diagnosis, medications and formulated a plan.  We also discussed red flags " for immediate return to clinic/ER, as well as indications for follow up if no improvement. Patient understood and agreed to plan. Tali Kumar was discharged with stable vitals and has no further questions.       No follow-ups on file.    Daniel Wilkinson, [facility], PA-LES  [facility] MICHELLE Cesar is a 57 year old, presenting for the following health issues:  Sinus Problem (Poss sinus infection X2 wks decline COVID test )      HPI     Tali Kumar, 57 year old, female presents to the urgent care today with:   Sinus Problem (Poss sinus infection X2 wks decline COVID test )      Review of Systems   Constitutional, HEENT, cardiovascular, pulmonary, gi and gu systems are negative, except as otherwise noted.      Objective    BP (!) 140/78   Pulse 78   Temp 98  F (36.7  C)   Resp 20   SpO2 98%   There is no height or weight on file to calculate BMI.  Physical Exam   GENERAL: healthy, alert and no distress  EYES: Eyes grossly normal to inspection, PERRL and conjunctivae and sclerae normal  HENT: normal cephalic/atraumatic, ear canals and TM's normal, nose and mouth without ulcers or lesions and sinuses: maxillary, frontal tenderness on right, maxillary, frontal swelling on right  NECK: no adenopathy, no asymmetry, masses, or scars and thyroid normal to palpation  RESP: lungs clear to auscultation - no rales, rhonchi or wheezes  CV: regular rate and rhythm, normal S1 S2, no S3 or S4, no murmur, click or rub, no peripheral edema and peripheral pulses strong  ABDOMEN: soft, nontender, no hepatosplenomegaly, no masses and bowel sounds normal  MS: no gross musculoskeletal defects noted, no edema  SKIN: no suspicious lesions or rashes  NEURO: Normal strength and tone, mentation intact and speech normal  PSYCH: mentation appears normal, affect normal/bright                    .  ..

## 2022-08-01 ASSESSMENT — ENCOUNTER SYMPTOMS
BREAST MASS: 0
DYSURIA: 0
CHILLS: 0
NAUSEA: 0
EYE PAIN: 0
HEARTBURN: 0
MYALGIAS: 0
HEADACHES: 0
FEVER: 0
PALPITATIONS: 0
FREQUENCY: 1
DIARRHEA: 0
ABDOMINAL PAIN: 0
SORE THROAT: 0
HEMATOCHEZIA: 0
JOINT SWELLING: 0
COUGH: 0
ARTHRALGIAS: 0
NERVOUS/ANXIOUS: 0
PARESTHESIAS: 0
DIZZINESS: 0
SHORTNESS OF BREATH: 0
CONSTIPATION: 1
HEMATURIA: 0
WEAKNESS: 0

## 2022-08-05 ENCOUNTER — OFFICE VISIT (OUTPATIENT)
Dept: FAMILY MEDICINE | Facility: CLINIC | Age: 57
End: 2022-08-05
Payer: COMMERCIAL

## 2022-08-05 VITALS
HEART RATE: 57 BPM | OXYGEN SATURATION: 99 % | TEMPERATURE: 97.6 F | DIASTOLIC BLOOD PRESSURE: 80 MMHG | HEIGHT: 66 IN | BODY MASS INDEX: 29.94 KG/M2 | SYSTOLIC BLOOD PRESSURE: 130 MMHG | WEIGHT: 186.3 LBS

## 2022-08-05 DIAGNOSIS — R20.2 PARESTHESIA: ICD-10-CM

## 2022-08-05 DIAGNOSIS — I25.10 CORONARY ARTERY DISEASE INVOLVING NATIVE CORONARY ARTERY OF NATIVE HEART WITHOUT ANGINA PECTORIS: ICD-10-CM

## 2022-08-05 DIAGNOSIS — Z79.899 MEDICATION MANAGEMENT: ICD-10-CM

## 2022-08-05 DIAGNOSIS — E78.5 HYPERLIPIDEMIA WITH TARGET LDL LESS THAN 70: ICD-10-CM

## 2022-08-05 DIAGNOSIS — Z00.00 ENCOUNTER FOR ROUTINE HISTORY AND PHYSICAL EXAM IN FEMALE: Primary | ICD-10-CM

## 2022-08-05 DIAGNOSIS — Z13.1 DIABETES MELLITUS SCREENING: ICD-10-CM

## 2022-08-05 LAB
ALBUMIN SERPL BCG-MCNC: 4.5 G/DL (ref 3.5–5.2)
ALBUMIN UR-MCNC: NEGATIVE MG/DL
ALP SERPL-CCNC: 74 U/L (ref 35–104)
ALT SERPL W P-5'-P-CCNC: 21 U/L (ref 10–35)
ANION GAP SERPL CALCULATED.3IONS-SCNC: 10 MMOL/L (ref 7–15)
APPEARANCE UR: CLEAR
AST SERPL W P-5'-P-CCNC: 30 U/L (ref 10–35)
BILIRUB SERPL-MCNC: 0.4 MG/DL
BILIRUB UR QL STRIP: NEGATIVE
BUN SERPL-MCNC: 10.9 MG/DL (ref 6–20)
CALCIUM SERPL-MCNC: 9.3 MG/DL (ref 8.6–10)
CHLORIDE SERPL-SCNC: 101 MMOL/L (ref 98–107)
CHOLEST SERPL-MCNC: 142 MG/DL
COLOR UR AUTO: YELLOW
CREAT SERPL-MCNC: 0.81 MG/DL (ref 0.51–0.95)
DEPRECATED HCO3 PLAS-SCNC: 27 MMOL/L (ref 22–29)
ERYTHROCYTE [DISTWIDTH] IN BLOOD BY AUTOMATED COUNT: 12 % (ref 10–15)
GFR SERPL CREATININE-BSD FRML MDRD: 84 ML/MIN/1.73M2
GLUCOSE SERPL-MCNC: 105 MG/DL (ref 70–99)
GLUCOSE UR STRIP-MCNC: NEGATIVE MG/DL
HBA1C MFR BLD: 5.6 % (ref 0–5.6)
HCT VFR BLD AUTO: 43.3 % (ref 35–47)
HDLC SERPL-MCNC: 78 MG/DL
HGB BLD-MCNC: 14.2 G/DL (ref 11.7–15.7)
HGB UR QL STRIP: NEGATIVE
KETONES UR STRIP-MCNC: NEGATIVE MG/DL
LDLC SERPL CALC-MCNC: 51 MG/DL
LEUKOCYTE ESTERASE UR QL STRIP: NEGATIVE
MCH RBC QN AUTO: 33.4 PG (ref 26.5–33)
MCHC RBC AUTO-ENTMCNC: 32.8 G/DL (ref 31.5–36.5)
MCV RBC AUTO: 102 FL (ref 78–100)
NITRATE UR QL: NEGATIVE
NONHDLC SERPL-MCNC: 64 MG/DL
PH UR STRIP: 7 [PH] (ref 5–8)
PLATELET # BLD AUTO: 196 10E3/UL (ref 150–450)
POTASSIUM SERPL-SCNC: 4.4 MMOL/L (ref 3.4–5.3)
PROT SERPL-MCNC: 6.9 G/DL (ref 6.4–8.3)
RBC # BLD AUTO: 4.25 10E6/UL (ref 3.8–5.2)
SODIUM SERPL-SCNC: 138 MMOL/L (ref 136–145)
SP GR UR STRIP: 1.01 (ref 1–1.03)
TRIGL SERPL-MCNC: 67 MG/DL
TSH SERPL DL<=0.005 MIU/L-ACNC: 4.13 UIU/ML (ref 0.3–4.2)
UROBILINOGEN UR STRIP-ACNC: 0.2 E.U./DL
VIT B12 SERPL-MCNC: 309 PG/ML (ref 232–1245)
WBC # BLD AUTO: 8.4 10E3/UL (ref 4–11)

## 2022-08-05 PROCEDURE — 84443 ASSAY THYROID STIM HORMONE: CPT | Performed by: NURSE PRACTITIONER

## 2022-08-05 PROCEDURE — 81003 URINALYSIS AUTO W/O SCOPE: CPT | Performed by: NURSE PRACTITIONER

## 2022-08-05 PROCEDURE — 83036 HEMOGLOBIN GLYCOSYLATED A1C: CPT | Performed by: NURSE PRACTITIONER

## 2022-08-05 PROCEDURE — 80053 COMPREHEN METABOLIC PANEL: CPT | Performed by: NURSE PRACTITIONER

## 2022-08-05 PROCEDURE — 90750 HZV VACC RECOMBINANT IM: CPT | Performed by: NURSE PRACTITIONER

## 2022-08-05 PROCEDURE — 90471 IMMUNIZATION ADMIN: CPT | Performed by: NURSE PRACTITIONER

## 2022-08-05 PROCEDURE — 99214 OFFICE O/P EST MOD 30 MIN: CPT | Mod: 25 | Performed by: NURSE PRACTITIONER

## 2022-08-05 PROCEDURE — 99396 PREV VISIT EST AGE 40-64: CPT | Mod: 25 | Performed by: NURSE PRACTITIONER

## 2022-08-05 PROCEDURE — 85027 COMPLETE CBC AUTOMATED: CPT | Performed by: NURSE PRACTITIONER

## 2022-08-05 PROCEDURE — 82607 VITAMIN B-12: CPT | Performed by: NURSE PRACTITIONER

## 2022-08-05 PROCEDURE — 36415 COLL VENOUS BLD VENIPUNCTURE: CPT | Performed by: NURSE PRACTITIONER

## 2022-08-05 PROCEDURE — 80061 LIPID PANEL: CPT | Performed by: NURSE PRACTITIONER

## 2022-08-05 RX ORDER — CEFUROXIME AXETIL 500 MG/1
TABLET ORAL
COMMUNITY
Start: 2022-07-27 | End: 2022-11-02

## 2022-08-05 ASSESSMENT — PAIN SCALES - GENERAL: PAINLEVEL: NO PAIN (0)

## 2022-08-05 NOTE — PROGRESS NOTES
Assessment and Plan:    Encounter for routine history and physical exam in female  Recommend consuming a healthy diet and exercising.  She declines pneumococcal vaccine.  Shingles vaccine provided today.  She is up-to-date on mammogram and colon cancer screening.  - CBC with platelets  - TSH with free T4 reflex  - UA Macro with Reflex to Micro and Culture - lab collect  - CBC with platelets  - TSH with free T4 reflex    Diabetes mellitus screening  - Hemoglobin A1c  - Hemoglobin A1c    Hyperlipidemia LDL goal < 70  She continues rosuvastatin.  Goal LDL is less than 70.  - Lipid panel reflex to direct LDL Fasting  - Lipid panel reflex to direct LDL Non-fasting    Coronary artery disease involving native coronary artery of native heart without angina pectoris  She is seeing cardiology.  She is asymptomatic.  We did discuss initiating a baby aspirin.  - Lipid panel reflex to direct LDL Non-fasting    Paresthesia  We will rule out vitamin B12 deficiency and electrolyte imbalance.  Will refer to neurology for further evaluation as patient may need an EMG.  - Vitamin B12  - Adult Neurology  Referral  - Vitamin B12  -CMP    Medication management  - Comprehensive metabolic panel  - Comprehensive metabolic panel      Subjective:     Tali is a 57 year old female presenting to the clinic for a female physical.     LMP: over 2 years ago, no bleeding since    Hx of abnormal pap smear: none   Last pap smear: 7/24/18 normal, negative HPV   Perform self-breast exams: occasionally   Vaginal discharge or irritation: none   Sexually active: yes,  for 22 years   Contraception: none   Concerns for STDs: none   Previous pregnancies:none     She has a history of CAD and is taking Rosuvastatin.  She sees cardiology.  She is not taking a daily aspirin.  She is asymptomatic.  We have been monitoring her blood pressure.  It was elevated initially today.  She does occasionally monitor it outside the clinic at a grocery store.   She states it has been elevated.  She is concerned for paresthesias.  Over the past 2 years, she has been experiencing intermittent paresthesias within her scalp and her right lower extremity.  She experiences tingling from her right shin to her foot.  She occasionally feels as though her foot is asleep.  She has not noticed any tremors or muscle weakness.  She has seen rheumatology in the past for concerns of underlying autoimmune disease.  Work-up was unremarkable.  Lastly, patient has been seeing ENT for right facial pain.  She was diagnosed with sinusitis.  She took a round of Augmentin and a Medrol Dosepak which did not provide relief.  She is currently taking cefuroxime and just completed her second Medrol Dosepak on Monday.  According to the patient, the CT scan of the sinuses showed fullness on the right side.  Patient has a follow-up appointment with ENT approaching.    Review of systems:  I performed a 10 point review of systems.  All pertinent positives and negatives are noted in the HPI. All others are negative.     No Known Allergies    Current Outpatient Medications   Medication     COQ10, UBIQUINOL, ORAL     KRILL OIL ORAL     rosuvastatin (CRESTOR) 20 MG tablet     No current facility-administered medications for this visit.       Social History     Socioeconomic History     Marital status:      Spouse name: Not on file     Number of children: Not on file     Years of education: Not on file     Highest education level: Not on file   Occupational History     Not on file   Tobacco Use     Smoking status: Never Smoker     Smokeless tobacco: Never Used   Substance and Sexual Activity     Alcohol use: Yes     Alcohol/week: 10.0 standard drinks     Types: 5 Glasses of wine, 5 Cans of beer per week     Comment: 10/week      Drug use: No     Sexual activity: Yes     Partners: Male     Birth control/protection: Post-menopausal, Male Surgical     Comment:     Other Topics Concern      "Parent/sibling w/ CABG, MI or angioplasty before 65F 55M? No   Social History Narrative     Not on file     Social Determinants of Health     Financial Resource Strain: Not on file   Food Insecurity: Not on file   Transportation Needs: Not on file   Physical Activity: Not on file   Stress: Not on file   Social Connections: Not on file   Intimate Partner Violence: Not on file   Housing Stability: Not on file       Past Medical History:   Diagnosis Date     Coronary artery calcification seen on CT scan      Hyperlipidemia      Overweight (BMI 25.0-29.9)        Family History   Problem Relation Age of Onset     Colon Cancer Maternal Grandfather 65     Macular Degeneration Maternal Grandfather      Stomach Cancer Mother 59     Other Cancer Mother         Stomach     Coronary Artery Disease Father      Parkinsonism Father      Pancreatic Cancer Father 59     Other Cancer Father         Pancreatic     Cerebrovascular Disease Maternal Grandmother      Parkinsonism Paternal Grandfather      Coronary Artery Disease Paternal Uncle      ALS Paternal Aunt      Multiple Sclerosis Other 35     Parkinsonism Cousin      Breast Cancer Cousin      Coronary Artery Disease Paternal Grandmother      Heart Disease No family hx of        Past Surgical History:   Procedure Laterality Date     BLADDER NECK RECONSTRUCTION       BUNIONECTOMY Left      MD PROBE NASOLAC DUCT,INSERT TUBE/STENT      Description: Probing Of Nasolacrimal Duct With Insertion Of Tube;  Recorded: 05/21/2012;       Objective:     BP (!) 174/77 (BP Location: Right arm, Patient Position: Sitting, Cuff Size: Adult Large)   Pulse 57   Temp 97.6  F (36.4  C) (Oral)   Ht 1.68 m (5' 6.14\")   Wt 84.5 kg (186 lb 4.8 oz)   SpO2 99%   BMI 29.94 kg/m      Patient is alert, no obvious distress.   Skin: Warm, dry.  No rashes or lesions. Skin turgor rapid return.   HEENT:  Eyes normal.  Ears normal.  Nose patent, mucosa pink.  Oropharynx mucosa pink, no lesions or tonsil " enlargement.   Neck:  Supple, without lymphadenopathy, bruits, JVD. Thyroid normal texture and size.    Lungs:  Clear to auscultation.  No wheezing, rales noted.  Respirations even and unlabored.   Heart:  Regular rate and rhythm.  No murmurs.   Breasts:  Normal.  No surrounding adenopathy.   Abdomen: Soft, nontender.  No organomegaly.  Bowel sounds normoactive.  No guarding or masses noted.   :  deferred  Musculoskeletal:  Full ROM of extremities.  Muscle strength equal +5/5.   Neurological:  Cranial nerves 2-12 intact.             Answers for HPI/ROS submitted by the patient on 8/1/2022  Frequency of exercise:: 4-5 days/week  Getting at least 3 servings of Calcium per day:: NO  Diet:: Low salt, Low fat/cholesterol, Breakfast skipped  Taking medications regularly:: Yes  Medication side effects:: None  Bi-annual eye exam:: Yes  Dental care twice a year:: Yes  Sleep apnea or symptoms of sleep apnea:: None  abdominal pain: No  Blood in stool: No  Blood in urine: No  chest pain: No  chills: No  congestion: No  constipation: Yes  cough: No  diarrhea: No  dizziness: No  ear pain: No  eye pain: No  nervous/anxious: No  fever: No  frequency: Yes  genital sores: No  headaches: No  hearing loss: No  heartburn: No  arthralgias: No  joint swelling: No  peripheral edema: No  mood changes: No  myalgias: No  nausea: No  dysuria: No  palpitations: No  Skin sensation changes: No  sore throat: No  urgency: Yes  rash: No  shortness of breath: No  visual disturbance: No  weakness: No  pelvic pain: No  vaginal bleeding: No  vaginal discharge: No  tenderness: Yes  breast mass: No  breast discharge: No  Additional concerns today:: Yes  Duration of exercise:: 45-60 minutes

## 2022-08-26 ENCOUNTER — TRANSFERRED RECORDS (OUTPATIENT)
Dept: HEALTH INFORMATION MANAGEMENT | Facility: CLINIC | Age: 57
End: 2022-08-26

## 2022-09-03 ENCOUNTER — HEALTH MAINTENANCE LETTER (OUTPATIENT)
Age: 57
End: 2022-09-03

## 2022-09-16 ENCOUNTER — TRANSFERRED RECORDS (OUTPATIENT)
Dept: HEALTH INFORMATION MANAGEMENT | Facility: CLINIC | Age: 57
End: 2022-09-16

## 2022-10-21 ENCOUNTER — TRANSFERRED RECORDS (OUTPATIENT)
Dept: HEALTH INFORMATION MANAGEMENT | Facility: CLINIC | Age: 57
End: 2022-10-21

## 2022-10-25 ENCOUNTER — ALLIED HEALTH/NURSE VISIT (OUTPATIENT)
Dept: FAMILY MEDICINE | Facility: CLINIC | Age: 57
End: 2022-10-25
Payer: COMMERCIAL

## 2022-10-25 DIAGNOSIS — Z23 ENCOUNTER FOR IMMUNIZATION: Primary | ICD-10-CM

## 2022-10-25 PROCEDURE — 90750 HZV VACC RECOMBINANT IM: CPT

## 2022-10-25 PROCEDURE — 90471 IMMUNIZATION ADMIN: CPT

## 2022-10-25 PROCEDURE — 99207 PR NO CHARGE NURSE ONLY: CPT

## 2022-10-26 ENCOUNTER — TELEPHONE (OUTPATIENT)
Dept: FAMILY MEDICINE | Facility: CLINIC | Age: 57
End: 2022-10-26

## 2022-10-26 DIAGNOSIS — E55.9 VITAMIN D DEFICIENCY: Primary | ICD-10-CM

## 2022-10-26 NOTE — TELEPHONE ENCOUNTER
Reason for Call:  Vitamin D Deficiency     Detailed comments: Patient is having symptoms of low vitamin D as she had in 2020.         Patient was prescribed Vit D and her symptoms improved. Patient would like to know if she should come in for a Vit D lab draw or if provider will send over a prescription.       Please advise. If appropriate send to [facility] in  on [place].   Phone Number Patient can be reached at: Home number on file [phone] (home)    Best Time: Any    Can we leave a detailed message on this number? YES    Call taken on 10/26/2022 at 8:46 AM by Ting Sanchez

## 2022-10-28 ENCOUNTER — LAB (OUTPATIENT)
Dept: LAB | Facility: CLINIC | Age: 57
End: 2022-10-28
Payer: COMMERCIAL

## 2022-10-28 DIAGNOSIS — E55.9 VITAMIN D DEFICIENCY: ICD-10-CM

## 2022-10-28 LAB — DEPRECATED CALCIDIOL+CALCIFEROL SERPL-MC: 20 UG/L (ref 20–75)

## 2022-10-28 PROCEDURE — 82306 VITAMIN D 25 HYDROXY: CPT

## 2022-10-28 PROCEDURE — 36415 COLL VENOUS BLD VENIPUNCTURE: CPT

## 2022-11-16 ENCOUNTER — VIRTUAL VISIT (OUTPATIENT)
Dept: URGENT CARE | Facility: CLINIC | Age: 57
End: 2022-11-16
Payer: COMMERCIAL

## 2022-11-16 DIAGNOSIS — U07.1 COVID: Primary | ICD-10-CM

## 2022-11-16 PROCEDURE — 99213 OFFICE O/P EST LOW 20 MIN: CPT | Mod: CS | Performed by: EMERGENCY MEDICINE

## 2022-11-16 NOTE — PROGRESS NOTES
"    The patient has been notified of following:     \"This telephone visit will be conducted via a call between you and your physician/provider. We have found that certain health care needs can be provided without the need for a physical exam.  This service lets us provide the care you need with a short phone conversation.  If a prescription is necessary we can send it directly to your pharmacy.  If lab work is needed we can place an order for that and you can then stop by our lab to have the test done at a later time.    Telephone visits are billed at different rates depending on your insurance coverage. During this emergency period, for some insurers they may be billed the same as an in-person visit.  Please reach out to your insurance provider with any questions.    If during the course of the call the physician/provider feels a telephone visit is not appropriate, you will not be charged for this service.\"    Patient has given verbal consent for Telephone visit?  Yes    What phone number would you like to be contacted at?  [phone]    How would you like to obtain your AVS? [employer]    Subjective   CC: Tali Kumar  is a 57 year old female who presents via phone visit today for the following health issues:   Chief Complaint   Patient presents with     Infection        COVID-19 Symptom Review  How many days ago did these symptoms start? 4    Are any of the following symptoms significant for you?    New or worsening difficulty breathing? No    Worsening cough? Yes, it's a dry cough.     Fever or chills? No    Headache: YES    Sore throat: No    Chest pain: No    Diarrhea: No    Body aches? YES-     What treatments has patient tried? Acetaminophen   Does patient live in a nursing home, group home, or shelter? No  Does patient have a way to get food/medications during quarantined? Yes, I have a friend or family member who can help me. and Yes                       Reviewed and updated as needed this visit by " Provider                  Review of Systems         Objective    Gen: Patient is alert, oriented  Gen: No acute distress on phone appointment.  Nondyspneic sounding speaking in full sentences.              Assessment/Plan:  Patient is a 57-year-old female who is on day 4 of COVID infection.  Symptoms are typical and relatively mild including no shortness of breath.  Patient has a history of elevated BMI and coronary artery disease and his comorbidities.  She does consent to taking paxlovid.  GFR is 84.  She understands to withhold her Crestor for the 5 days of treatment.    Phone call duration:  10 minutes    Cartacho An MD

## 2022-12-12 ENCOUNTER — HOSPITAL ENCOUNTER (OUTPATIENT)
Dept: MAMMOGRAPHY | Facility: CLINIC | Age: 57
Discharge: HOME | End: 2022-12-12
Attending: NURSE PRACTITIONER | Admitting: NURSE PRACTITIONER
Payer: COMMERCIAL

## 2022-12-12 DIAGNOSIS — Z12.31 VISIT FOR SCREENING MAMMOGRAM: ICD-10-CM

## 2022-12-12 PROCEDURE — 77067 SCR MAMMO BI INCL CAD: CPT

## 2022-12-23 ENCOUNTER — ANCILLARY PROCEDURE (OUTPATIENT)
Dept: MAMMOGRAPHY | Facility: CLINIC | Age: 57
End: 2022-12-23
Attending: NURSE PRACTITIONER
Payer: COMMERCIAL

## 2022-12-23 DIAGNOSIS — N64.89 BREAST ASYMMETRY: ICD-10-CM

## 2022-12-23 PROCEDURE — 77061 BREAST TOMOSYNTHESIS UNI: CPT | Mod: LT

## 2023-01-02 ENCOUNTER — TELEPHONE (OUTPATIENT)
Dept: CARDIOLOGY | Facility: CLINIC | Age: 58
End: 2023-01-02

## 2023-01-02 DIAGNOSIS — E78.5 HYPERLIPIDEMIA WITH TARGET LDL LESS THAN 70: ICD-10-CM

## 2023-01-02 DIAGNOSIS — I25.10 CORONARY ARTERY DISEASE INVOLVING NATIVE CORONARY ARTERY OF NATIVE HEART WITHOUT ANGINA PECTORIS: Primary | ICD-10-CM

## 2023-01-02 RX ORDER — ROSUVASTATIN CALCIUM 20 MG/1
20 TABLET, COATED ORAL DAILY
Qty: 90 TABLET | Refills: 0 | Status: SHIPPED | OUTPATIENT
Start: 2023-01-02 | End: 2023-03-15

## 2023-01-02 NOTE — TELEPHONE ENCOUNTER
Pt was on Rosuvastatin 20 mg daily, and had cholesterol levels checked 8/2022. EMG wrote to continue on medication. New Rx sent for a 90 day supply. Pt overdue for follow-up, due 11/2022. Follow-up order placed. Staff message sent to schedulers to arrange. My chart message to patient to inform. CARMELORn

## 2023-01-02 NOTE — TELEPHONE ENCOUNTER
Health Call Center    Phone Message    May a detailed message be left on voicemail: yes     Reason for Call: Medication Refill Request    Has the patient contacted the pharmacy for the refill? Yes   Name of medication being requested: rosuvastatin (CRESTOR) 20 MG tablet   Provider who prescribed the medication: Dr. Chew  Pharmacy: [id number] [address]  AT [facility]  Date medication is needed: 1.5.23    NOTE:  Pt was supposed to stop the med for 5 days when she had Covid And then restart.  That info was sent to the Pharmacy but they did not restart it in 5 days it was discontinued all together.  Now when pt is out of meds the pharmacy will not restart it because they marked it as discontinued.  Please send a new script to pharmacy for this med.      Action Taken: Message routed to:  [facility] cardio    Travel Screening: Not Applicable     Thank you!  [facility]

## 2023-01-11 ENCOUNTER — TRANSFERRED RECORDS (OUTPATIENT)
Dept: HEALTH INFORMATION MANAGEMENT | Facility: CLINIC | Age: 58
End: 2023-01-11
Payer: COMMERCIAL

## 2023-01-19 ENCOUNTER — TRANSFERRED RECORDS (OUTPATIENT)
Dept: HEALTH INFORMATION MANAGEMENT | Facility: CLINIC | Age: 58
End: 2023-01-19
Payer: COMMERCIAL

## 2023-01-20 ENCOUNTER — TRANSFERRED RECORDS (OUTPATIENT)
Dept: HEALTH INFORMATION MANAGEMENT | Facility: CLINIC | Age: 58
End: 2023-01-20

## 2023-01-25 ENCOUNTER — TRANSFERRED RECORDS (OUTPATIENT)
Dept: HEALTH INFORMATION MANAGEMENT | Facility: CLINIC | Age: 58
End: 2023-01-25

## 2023-01-31 ENCOUNTER — TRANSFERRED RECORDS (OUTPATIENT)
Dept: HEALTH INFORMATION MANAGEMENT | Facility: CLINIC | Age: 58
End: 2023-01-31
Payer: COMMERCIAL

## 2023-04-06 ENCOUNTER — TRANSFERRED RECORDS (OUTPATIENT)
Dept: HEALTH INFORMATION MANAGEMENT | Facility: CLINIC | Age: 58
End: 2023-04-06
Payer: COMMERCIAL

## 2023-04-22 DIAGNOSIS — E78.5 HYPERLIPIDEMIA WITH TARGET LDL LESS THAN 70: ICD-10-CM

## 2023-04-22 DIAGNOSIS — I25.10 CORONARY ARTERY DISEASE INVOLVING NATIVE CORONARY ARTERY OF NATIVE HEART WITHOUT ANGINA PECTORIS: ICD-10-CM

## 2023-04-24 RX ORDER — ROSUVASTATIN CALCIUM 20 MG/1
TABLET, COATED ORAL
Qty: 90 TABLET | Refills: 1 | Status: SHIPPED | OUTPATIENT
Start: 2023-04-24 | End: 2023-08-07

## 2023-05-02 ENCOUNTER — TRANSFERRED RECORDS (OUTPATIENT)
Dept: HEALTH INFORMATION MANAGEMENT | Facility: CLINIC | Age: 58
End: 2023-05-02
Payer: COMMERCIAL

## 2023-05-25 ENCOUNTER — TRANSFERRED RECORDS (OUTPATIENT)
Dept: HEALTH INFORMATION MANAGEMENT | Facility: CLINIC | Age: 58
End: 2023-05-25
Payer: COMMERCIAL

## 2023-07-31 ASSESSMENT — ENCOUNTER SYMPTOMS
DYSURIA: 0
COUGH: 0
SORE THROAT: 0
NAUSEA: 0
FEVER: 0
DIZZINESS: 0
FREQUENCY: 0
HEMATOCHEZIA: 0
EYE PAIN: 0
CHILLS: 0
PALPITATIONS: 0
ARTHRALGIAS: 1
DIARRHEA: 0
ABDOMINAL PAIN: 0
NERVOUS/ANXIOUS: 0
BREAST MASS: 0
HEADACHES: 0
CONSTIPATION: 1
SHORTNESS OF BREATH: 0
HEARTBURN: 0
MYALGIAS: 1
JOINT SWELLING: 0
HEMATURIA: 0
PARESTHESIAS: 0
WEAKNESS: 0

## 2023-08-02 RX ORDER — POLYETHYLENE GLYCOL 3350 17 G/17G
1 POWDER, FOR SOLUTION ORAL PRN
COMMUNITY
Start: 2021-07-26

## 2023-08-02 RX ORDER — BUTYROSPERMUM PARKII(SHEA BUTTER), SIMMONDSIA CHINENSIS (JOJOBA) SEED OIL, ALOE BARBADENSIS LEAF EXTRACT .01; 1; 3.5 G/100G; G/100G; G/100G
250 LIQUID TOPICAL
COMMUNITY
Start: 2023-01-11

## 2023-08-02 RX ORDER — FOLIC ACID AND CYANOCOBALAMIN 1; 500 MG/1; UG/1
1 TABLET, FILM COATED ORAL DAILY
COMMUNITY
Start: 2023-01-11 | End: 2023-08-07

## 2023-08-07 ENCOUNTER — OFFICE VISIT (OUTPATIENT)
Dept: FAMILY MEDICINE | Facility: CLINIC | Age: 58
End: 2023-08-07
Payer: COMMERCIAL

## 2023-08-07 VITALS
HEIGHT: 66 IN | HEART RATE: 54 BPM | TEMPERATURE: 97.6 F | BODY MASS INDEX: 30.65 KG/M2 | RESPIRATION RATE: 12 BRPM | DIASTOLIC BLOOD PRESSURE: 84 MMHG | OXYGEN SATURATION: 99 % | SYSTOLIC BLOOD PRESSURE: 120 MMHG | WEIGHT: 190.7 LBS

## 2023-08-07 DIAGNOSIS — Z80.0 FAMILY HISTORY OF PANCREATIC CANCER: ICD-10-CM

## 2023-08-07 DIAGNOSIS — I25.10 CORONARY ARTERY DISEASE INVOLVING NATIVE CORONARY ARTERY OF NATIVE HEART WITHOUT ANGINA PECTORIS: ICD-10-CM

## 2023-08-07 DIAGNOSIS — Z13.1 DIABETES MELLITUS SCREENING: ICD-10-CM

## 2023-08-07 DIAGNOSIS — E55.9 VITAMIN D DEFICIENCY: ICD-10-CM

## 2023-08-07 DIAGNOSIS — Z12.4 CERVICAL CANCER SCREENING: ICD-10-CM

## 2023-08-07 DIAGNOSIS — N84.1 CERVICAL POLYP: ICD-10-CM

## 2023-08-07 DIAGNOSIS — E66.09 CLASS 1 OBESITY DUE TO EXCESS CALORIES WITH SERIOUS COMORBIDITY AND BODY MASS INDEX (BMI) OF 30.0 TO 30.9 IN ADULT: ICD-10-CM

## 2023-08-07 DIAGNOSIS — Z00.00 ENCOUNTER FOR ROUTINE HISTORY AND PHYSICAL EXAM IN FEMALE: Primary | ICD-10-CM

## 2023-08-07 DIAGNOSIS — E66.811 CLASS 1 OBESITY DUE TO EXCESS CALORIES WITH SERIOUS COMORBIDITY AND BODY MASS INDEX (BMI) OF 30.0 TO 30.9 IN ADULT: ICD-10-CM

## 2023-08-07 DIAGNOSIS — Z79.899 MEDICATION MANAGEMENT: ICD-10-CM

## 2023-08-07 DIAGNOSIS — E78.5 HYPERLIPIDEMIA WITH TARGET LDL LESS THAN 70: ICD-10-CM

## 2023-08-07 DIAGNOSIS — M54.50 LUMBAR PAIN: ICD-10-CM

## 2023-08-07 PROBLEM — E66.3 OVERWEIGHT: Status: RESOLVED | Noted: 2017-03-02 | Resolved: 2023-08-07

## 2023-08-07 LAB
ALBUMIN SERPL BCG-MCNC: 4.9 G/DL (ref 3.5–5.2)
ALBUMIN UR-MCNC: NEGATIVE MG/DL
ALP SERPL-CCNC: 75 U/L (ref 35–104)
ALT SERPL W P-5'-P-CCNC: 21 U/L (ref 0–50)
ANION GAP SERPL CALCULATED.3IONS-SCNC: 12 MMOL/L (ref 7–15)
APPEARANCE UR: CLEAR
AST SERPL W P-5'-P-CCNC: 34 U/L (ref 0–45)
BILIRUB SERPL-MCNC: 0.5 MG/DL
BILIRUB UR QL STRIP: NEGATIVE
BUN SERPL-MCNC: 12.2 MG/DL (ref 6–20)
CALCIUM SERPL-MCNC: 9.3 MG/DL (ref 8.6–10)
CHLORIDE SERPL-SCNC: 101 MMOL/L (ref 98–107)
CHOLEST SERPL-MCNC: 147 MG/DL
COLOR UR AUTO: YELLOW
CREAT SERPL-MCNC: 0.83 MG/DL (ref 0.51–0.95)
DEPRECATED HCO3 PLAS-SCNC: 25 MMOL/L (ref 22–29)
ERYTHROCYTE [DISTWIDTH] IN BLOOD BY AUTOMATED COUNT: 12.2 % (ref 10–15)
GFR SERPL CREATININE-BSD FRML MDRD: 81 ML/MIN/1.73M2
GLUCOSE SERPL-MCNC: 119 MG/DL (ref 70–99)
GLUCOSE UR STRIP-MCNC: NEGATIVE MG/DL
HBA1C MFR BLD: 5.5 % (ref 0–5.6)
HCT VFR BLD AUTO: 42.3 % (ref 35–47)
HDLC SERPL-MCNC: 88 MG/DL
HGB BLD-MCNC: 13.9 G/DL (ref 11.7–15.7)
HGB UR QL STRIP: NEGATIVE
KETONES UR STRIP-MCNC: NEGATIVE MG/DL
LDLC SERPL CALC-MCNC: 48 MG/DL
LEUKOCYTE ESTERASE UR QL STRIP: NEGATIVE
MCH RBC QN AUTO: 33.3 PG (ref 26.5–33)
MCHC RBC AUTO-ENTMCNC: 32.9 G/DL (ref 31.5–36.5)
MCV RBC AUTO: 101 FL (ref 78–100)
NITRATE UR QL: NEGATIVE
NONHDLC SERPL-MCNC: 59 MG/DL
PH UR STRIP: 7 [PH] (ref 5–8)
PLATELET # BLD AUTO: 181 10E3/UL (ref 150–450)
POTASSIUM SERPL-SCNC: 4.7 MMOL/L (ref 3.4–5.3)
PROT SERPL-MCNC: 7.1 G/DL (ref 6.4–8.3)
RBC # BLD AUTO: 4.18 10E6/UL (ref 3.8–5.2)
SODIUM SERPL-SCNC: 138 MMOL/L (ref 136–145)
SP GR UR STRIP: 1.01 (ref 1–1.03)
TRIGL SERPL-MCNC: 56 MG/DL
TSH SERPL DL<=0.005 MIU/L-ACNC: 3.41 UIU/ML (ref 0.3–4.2)
UROBILINOGEN UR STRIP-ACNC: 0.2 E.U./DL
WBC # BLD AUTO: 6.5 10E3/UL (ref 4–11)

## 2023-08-07 PROCEDURE — 80053 COMPREHEN METABOLIC PANEL: CPT | Performed by: NURSE PRACTITIONER

## 2023-08-07 PROCEDURE — 87624 HPV HI-RISK TYP POOLED RSLT: CPT | Performed by: NURSE PRACTITIONER

## 2023-08-07 PROCEDURE — 36415 COLL VENOUS BLD VENIPUNCTURE: CPT | Performed by: NURSE PRACTITIONER

## 2023-08-07 PROCEDURE — 82306 VITAMIN D 25 HYDROXY: CPT | Performed by: NURSE PRACTITIONER

## 2023-08-07 PROCEDURE — G0145 SCR C/V CYTO,THINLAYER,RESCR: HCPCS | Performed by: NURSE PRACTITIONER

## 2023-08-07 PROCEDURE — 99214 OFFICE O/P EST MOD 30 MIN: CPT | Mod: 25 | Performed by: NURSE PRACTITIONER

## 2023-08-07 PROCEDURE — 99396 PREV VISIT EST AGE 40-64: CPT | Performed by: NURSE PRACTITIONER

## 2023-08-07 PROCEDURE — 83036 HEMOGLOBIN GLYCOSYLATED A1C: CPT | Performed by: NURSE PRACTITIONER

## 2023-08-07 PROCEDURE — 83690 ASSAY OF LIPASE: CPT | Performed by: NURSE PRACTITIONER

## 2023-08-07 PROCEDURE — 81003 URINALYSIS AUTO W/O SCOPE: CPT | Performed by: NURSE PRACTITIONER

## 2023-08-07 PROCEDURE — 84443 ASSAY THYROID STIM HORMONE: CPT | Performed by: NURSE PRACTITIONER

## 2023-08-07 PROCEDURE — 85027 COMPLETE CBC AUTOMATED: CPT | Performed by: NURSE PRACTITIONER

## 2023-08-07 PROCEDURE — 80061 LIPID PANEL: CPT | Performed by: NURSE PRACTITIONER

## 2023-08-07 RX ORDER — METHOCARBAMOL 500 MG/1
500 TABLET, FILM COATED ORAL 3 TIMES DAILY PRN
Qty: 30 TABLET | Refills: 1 | Status: SHIPPED | OUTPATIENT
Start: 2023-08-07 | End: 2024-04-02

## 2023-08-07 RX ORDER — ROSUVASTATIN CALCIUM 20 MG/1
20 TABLET, COATED ORAL DAILY
Qty: 90 TABLET | Refills: 3 | Status: SHIPPED | OUTPATIENT
Start: 2023-08-07 | End: 2024-06-12

## 2023-08-07 ASSESSMENT — PAIN SCALES - GENERAL: PAINLEVEL: MILD PAIN (3)

## 2023-08-07 NOTE — PROGRESS NOTES
Assessment and Plan:    Encounter for routine history and physical exam in female  Recommend consuming a healthy diet and exercising.  She declines pneumococcal vaccine.  - CBC with platelets  - TSH with free T4 reflex  - UA Macroscopic with reflex to Microscopic and Culture - Lab Collect    Diabetes mellitus screening  - Hemoglobin A1c    Cervical cancer screening  - Pap Screen with HPV - recommended age 30 - 65 years    Coronary artery disease involving native coronary artery of native heart without angina pectoris  Goal LDL is less than 70.  She continues rosuvastatin.  She follows with cardiology.  - rosuvastatin (CRESTOR) 20 MG tablet  Dispense: 90 tablet; Refill: 3    Hyperlipidemia LDL goal < 70  We will check lipid cascade.  She continues rosuvastatin.  - Lipid panel reflex to direct LDL Fasting  - rosuvastatin (CRESTOR) 20 MG tablet  Dispense: 90 tablet; Refill: 3    Lumbar pain  Differentials include myofascial pain, bulging or herniated disc.  Provided prescription for methocarbamol to take as needed.  Educated on its indications and side effects.  She is to avoid taking this with other sedatives.  Will refer to spine clinic.  - methocarbamol (ROBAXIN) 500 MG tablet  Dispense: 30 tablet; Refill: 1  - Spine  Referral    Vitamin D Deficiency  She is not currently taking supplementation.  Will notify patient of results.  - Vitamin D Deficiency    Cervical polyp  We will refer to gynecology.  - Ob/Gyn Referral    Class 1 obesity due to excess calories with serious comorbidity and body mass index (BMI) of 30.0 to 30.9 in adult  Recommend consuming a healthy diet and exercising.  This is contributing to CAD and hyperlipidemia.    Medication management  - Comprehensive metabolic panel      Subjective:     Tali is a 58 year old female presenting to the clinic for a female physical.     LMP: over 3 years ago, no bleeding since    Hx of abnormal pap smear: none   Last pap smear: 7/24/18 normal, negative  HPV   Perform self-breast exams: occasionally   Vaginal discharge or irritation: none   Sexually active: yes,  for 23 years   Contraception: none   Concerns for STDs: none   Previous pregnancies:none       Patient has a history of CAD and is taking rosuvastatin 20 mg daily.  She is consuming a healthy diet and walks for exercise.  She has been suffering from low back pain for multiple months.  She saw a chiropractor and [facility].  She has been working with physical therapy.  Pain is a dull ache within her bilateral lower lumbar region.  She occasionally experiences numbness and tingling of her lower extremities.  She denies any urinary fecal incontinence or retention.  She has been taking Advil and applying ice and heat.  She has a history of vitamin D deficiency and is not currently taking supplementation.    Review of systems:  I performed a 10 point review of systems.  All pertinent positives and negatives are noted in the HPI. All others are negative.     No Known Allergies    Current Outpatient Medications   Medication    COQ10, UBIQUINOL, ORAL    KRILL OIL ORAL    Magnesium Citrate 100 MG CAPS    Multiple Vitamins-Minerals (VITAMIN D3 COMPLETE) TABS    polyethylene glycol (MIRALAX) 17 GM/Dose powder    rosuvastatin (CRESTOR) 20 MG tablet    Cobalamin Combinations (FOLTRATE) 500-1 MCG-MG TABS     No current facility-administered medications for this visit.       Social History     Socioeconomic History    Marital status:      Spouse name: Not on file    Number of children: Not on file    Years of education: Not on file    Highest education level: Not on file   Occupational History    Not on file   Tobacco Use    Smoking status: Never     Passive exposure: Current    Smokeless tobacco: Never    Tobacco comments:      smokes   Vaping Use    Vaping Use: Never used   Substance and Sexual Activity    Alcohol use: Yes     Alcohol/week: 10.0 standard drinks of alcohol     Types: 5  "Glasses of wine, 5 Cans of beer per week     Comment: 10/week     Drug use: No    Sexual activity: Yes     Partners: Male     Birth control/protection: Post-menopausal, Male Surgical     Comment:     Other Topics Concern    Parent/sibling w/ CABG, MI or angioplasty before 65F 55M? No   Social History Narrative    Not on file     Social Determinants of Health     Financial Resource Strain: Not on file   Food Insecurity: Not on file   Transportation Needs: Not on file   Physical Activity: Not on file   Stress: Not on file   Social Connections: Not on file   Intimate Partner Violence: Not on file   Housing Stability: Not on file       Past Medical History:   Diagnosis Date    Coronary artery calcification seen on CT scan     Hyperlipidemia     Overweight (BMI 25.0-29.9)        Family History   Problem Relation Age of Onset    Colon Cancer Maternal Grandfather 65    Macular Degeneration Maternal Grandfather     Stomach Cancer Mother 59    Other Cancer Mother         Stomach    Coronary Artery Disease Father     Parkinsonism Father     Pancreatic Cancer Father 59    Other Cancer Father         Pancreatic    Cerebrovascular Disease Maternal Grandmother     Parkinsonism Paternal Grandfather     Coronary Artery Disease Paternal Uncle     ALS Paternal Aunt     Multiple Sclerosis Other 35    Parkinsonism Cousin     Breast Cancer Cousin     Coronary Artery Disease Paternal Grandmother     Heart Disease No family hx of        Past Surgical History:   Procedure Laterality Date    BLADDER NECK RECONSTRUCTION      BUNIONECTOMY Left     [place] PROBE NASOLAC DUCT,INSERT TUBE/STENT      Description: Probing Of Nasolacrimal Duct With Insertion Of Tube;  Recorded: 05/21/2012;       Objective:     /84   Pulse 54   Temp 97.6  F (36.4  C)   Resp 12   Ht 1.682 m (5' 6.22\")   Wt 86.5 kg (190 lb 11.2 oz)   SpO2 99%   BMI 30.58 kg/m      Patient is alert, no obvious distress.   Skin: Warm, dry.  No rashes or lesions. Skin " turgor rapid return.   HEENT:  Eyes normal.  Ears normal.  Nose patent, mucosa pink.  Oropharynx mucosa pink, no lesions or tonsil enlargement.   Neck:  Supple, without lymphadenopathy, bruits, JVD. Thyroid normal texture and size.    Lungs:  Clear to auscultation.  No wheezing, rales noted.  Respirations even and unlabored.   Heart:  Regular rate and rhythm.  No murmurs.   Breasts:  Normal.  No surrounding adenopathy.   Abdomen: Soft, nontender.  No organomegaly.  Bowel sounds normoactive.  No guarding or masses noted.   :  External genitalia normal.  Normal vaginal mucosa.  Small cervical polyp protruding from the cervical os.  No cervical motion tenderness.   Musculoskeletal:  Full ROM of extremities.  Muscle strength equal +5/5.   Neurological:  Cranial nerves 2-12 intact.                Answers submitted by the patient for this visit:  Annual Preventive Visit (Submitted on 7/31/2023)  Chief Complaint: Annual Exam:  Frequency of exercise:: 4-5 days/week  Getting at least 3 servings of Calcium per day:: NO  Diet:: Breakfast skipped  Taking medications regularly:: Yes  Bi-annual eye exam:: Yes  Dental care twice a year:: Yes  Sleep apnea or symptoms of sleep apnea:: None  abdominal pain: No  Blood in stool: No  Blood in urine: No  chest pain: No  chills: No  congestion: No  constipation: Yes  cough: No  diarrhea: No  dizziness: No  ear pain: No  eye pain: No  nervous/anxious: No  fever: No  frequency: No  genital sores: No  headaches: No  hearing loss: No  heartburn: No  arthralgias: Yes  joint swelling: No  peripheral edema: No  mood changes: No  myalgias: Yes  nausea: No  dysuria: No  palpitations: No  Skin sensation changes: No  sore throat: No  urgency: No  rash: No  shortness of breath: No  visual disturbance: No  weakness: No  pelvic pain: No  vaginal bleeding: No  vaginal discharge: No  tenderness: No  breast mass: No  breast discharge: No  Additional concerns today:: Yes  Exercise outside of work  (Submitted on 7/31/2023)  Chief Complaint: Annual Exam:  Duration of exercise:: 45-60 minutes

## 2023-08-08 LAB — DEPRECATED CALCIDIOL+CALCIFEROL SERPL-MC: 46 UG/L (ref 20–75)

## 2023-08-09 DIAGNOSIS — Z80.0 FAMILY HISTORY OF PANCREATIC CANCER: Primary | ICD-10-CM

## 2023-08-09 LAB
BKR LAB AP GYN ADEQUACY: NORMAL
BKR LAB AP GYN INTERPRETATION: NORMAL
BKR LAB AP HPV REFLEX: NORMAL
BKR LAB AP PREVIOUS ABNORMAL: NORMAL
LIPASE SERPL-CCNC: 47 U/L (ref 13–60)
PATH REPORT.COMMENTS IMP SPEC: NORMAL
PATH REPORT.COMMENTS IMP SPEC: NORMAL
PATH REPORT.RELEVANT HX SPEC: NORMAL

## 2023-08-10 LAB
HUMAN PAPILLOMA VIRUS 16 DNA: NEGATIVE
HUMAN PAPILLOMA VIRUS 18 DNA: NEGATIVE
HUMAN PAPILLOMA VIRUS FINAL DIAGNOSIS: NORMAL
HUMAN PAPILLOMA VIRUS OTHER HR: NEGATIVE

## 2023-08-17 ENCOUNTER — TRANSFERRED RECORDS (OUTPATIENT)
Dept: HEALTH INFORMATION MANAGEMENT | Facility: CLINIC | Age: 58
End: 2023-08-17
Payer: COMMERCIAL

## 2023-09-26 ENCOUNTER — TRANSFERRED RECORDS (OUTPATIENT)
Dept: HEALTH INFORMATION MANAGEMENT | Facility: CLINIC | Age: 58
End: 2023-09-26
Payer: COMMERCIAL

## 2023-12-13 ENCOUNTER — HOSPITAL ENCOUNTER (OUTPATIENT)
Dept: MAMMOGRAPHY | Facility: CLINIC | Age: 58
Discharge: HOME | End: 2023-12-13
Attending: NURSE PRACTITIONER | Admitting: NURSE PRACTITIONER
Payer: COMMERCIAL

## 2023-12-13 DIAGNOSIS — Z12.31 VISIT FOR SCREENING MAMMOGRAM: ICD-10-CM

## 2023-12-13 PROCEDURE — 77067 SCR MAMMO BI INCL CAD: CPT

## 2023-12-19 ENCOUNTER — TRANSFERRED RECORDS (OUTPATIENT)
Dept: HEALTH INFORMATION MANAGEMENT | Facility: CLINIC | Age: 58
End: 2023-12-19
Payer: COMMERCIAL

## 2024-04-02 ENCOUNTER — OFFICE VISIT (OUTPATIENT)
Dept: CARDIOLOGY | Facility: CLINIC | Age: 59
End: 2024-04-02
Payer: COMMERCIAL

## 2024-04-02 VITALS
DIASTOLIC BLOOD PRESSURE: 80 MMHG | SYSTOLIC BLOOD PRESSURE: 144 MMHG | WEIGHT: 192.2 LBS | OXYGEN SATURATION: 100 % | HEART RATE: 60 BPM | RESPIRATION RATE: 16 BRPM | BODY MASS INDEX: 30.82 KG/M2

## 2024-04-02 DIAGNOSIS — R03.0 WHITE COAT SYNDROME WITHOUT DIAGNOSIS OF HYPERTENSION: ICD-10-CM

## 2024-04-02 DIAGNOSIS — I25.10 CORONARY ARTERY DISEASE INVOLVING NATIVE CORONARY ARTERY OF NATIVE HEART, UNSPECIFIED WHETHER ANGINA PRESENT: ICD-10-CM

## 2024-04-02 DIAGNOSIS — R03.0 ELEVATED BP WITHOUT DIAGNOSIS OF HYPERTENSION: ICD-10-CM

## 2024-04-02 DIAGNOSIS — R07.89 ATYPICAL CHEST PAIN: Primary | ICD-10-CM

## 2024-04-02 PROCEDURE — G2211 COMPLEX E/M VISIT ADD ON: HCPCS | Performed by: STUDENT IN AN ORGANIZED HEALTH CARE EDUCATION/TRAINING PROGRAM

## 2024-04-02 PROCEDURE — 99214 OFFICE O/P EST MOD 30 MIN: CPT | Performed by: STUDENT IN AN ORGANIZED HEALTH CARE EDUCATION/TRAINING PROGRAM

## 2024-04-02 RX ORDER — UBIDECARENONE 100 MG
100 CAPSULE ORAL DAILY
COMMUNITY

## 2024-04-02 RX ORDER — LANOLIN ALCOHOL/MO/W.PET/CERES
1000 CREAM (GRAM) TOPICAL DAILY
COMMUNITY

## 2024-04-02 NOTE — LETTER
4/2/2024    Cherry Mccauley, APRN CNP  [address]  [facility] [address]    RE: Tali Kumar       Dear Colleague,     I had the pleasure of seeing Tali Kumar in the [facility].    [facility]   [address]  [place] [address]   [email or web address]   OFFICE: [phone]     CARDIOLOGY CLINIC NOTE     Thank you, Cherry Donovan, for asking the [facility] Heart Care team to see Ms. Tali Kumar to evaluate Follow Up          History of Present Illness   Ms. Tali Kumar is a 58 year old female with a significant past history of preclinical CAD, HLD, who presents for routine follow up.  The patient notes over the last week she has started to experience a pressure/tightness in her L upper chest.  This does not seem to have any relationship with exertion.  She denies any dyspnea or other associated symptoms.  She walks a lot throughout the day.  She is worried that this symptom may be coming from her CAD as she was previously asymptomatic.      Other than noted above, Ms. Kumar denies any chest pain/pressure/tightness, shortness of breath at rest or with exertion, light headedness/dizziness, pre-syncope, syncope, lower extremity swelling, palpitations, paroxysmal nocturnal dyspnea (PND), or orthopnea.       Medications  Allergies   Current Outpatient Medications   Medication Sig Dispense Refill    ASPIRIN NOT PRESCRIBED (INTENTIONAL) Please choose reason not prescribed from choices below.      COQ10, UBIQUINOL, ORAL [COQ10, UBIQUINOL, ORAL] Take by mouth.             KRILL OIL ORAL [KRILL OIL ORAL] Take by mouth.      Magnesium Citrate 100 MG CAPS       methocarbamol (ROBAXIN) 500 MG tablet Take 1 tablet (500 mg) by mouth 3 times daily as needed for muscle spasms 30 tablet 1    Multiple Vitamins-Minerals (VITAMIN D3 COMPLETE) TABS       polyethylene glycol (MIRALAX) 17 GM/Dose powder Take 1 packet by mouth every 24 hours take 1  Packet by oral route every day mixed with 8 oz. water, juice, soda, coffee or tea      rosuvastatin (CRESTOR) 20 MG tablet Take 1 tablet (20 mg) by mouth daily 90 tablet 3      No Known Allergies     Physical Examination Review of Systems   Vitals: There were no vitals taken for this visit.  BMI= There is no height or weight on file to calculate BMI.  Wt Readings from Last 3 Encounters:   08/07/23 86.5 kg (190 lb 11.2 oz)   08/05/22 84.5 kg (186 lb 4.8 oz)   11/23/21 83.5 kg (184 lb)       General: pleasant female. No acute distress.   Neck: No JVD  Lungs: clear to auscultation  COR:  regular rate and rhythm, No murmurs, rubs, or gallops  Extrem: No edema        Please refer above for cardiac ROS details.       Past History     Family History:   Family History   Problem Relation Age of Onset    Colon Cancer Maternal Grandfather 65.00    Macular Degeneration Maternal Grandfather     Stomach Cancer Mother 59    Other Cancer Mother         Stomach    Coronary Artery Disease Father     Parkinsonism Father     Pancreatic Cancer Father 59    Other Cancer Father         Pancreatic    Cerebrovascular Disease Maternal Grandmother     Parkinsonism Paternal Grandfather     Coronary Artery Disease Paternal Uncle     ALS Paternal Aunt     Multiple Sclerosis Other 35    Parkinsonism Cousin     Breast Cancer Cousin     Coronary Artery Disease Paternal Grandmother     Heart Disease No family hx of         Social History:   Social History     Socioeconomic History    Marital status:      Spouse name: Not on file    Number of children: Not on file    Years of education: Not on file    Highest education level: Not on file   Occupational History    Not on file   Tobacco Use    Smoking status: Never     Passive exposure: Current    Smokeless tobacco: Never    Tobacco comments:      smokes   Vaping Use    Vaping Use: Never used   Substance and Sexual Activity    Alcohol use: Yes     Alcohol/week: 10.0 standard drinks of  alcohol     Types: 5 Glasses of wine, 5 Cans of beer per week     Comment: 10/week     Drug use: No    Sexual activity: Yes     Partners: Male     Birth control/protection: Post-menopausal, Male Surgical     Comment:     Other Topics Concern    Parent/sibling w/ CABG, MI or angioplasty before 65F 55M? No   Social History Narrative    Not on file     Social Determinants of Health     Financial Resource Strain: Not on file   Food Insecurity: Not on file   Transportation Needs: Not on file   Physical Activity: Not on file   Stress: Not on file   Social Connections: Not on file   Interpersonal Safety: Not on file   Housing Stability: Not on file            Lab Results    Chemistry/lipid CBC Cardiac Enzymes/BNP/TSH/INR   Lab Results   Component Value Date    CHOL 147 08/07/2023    HDL 88 08/07/2023    TRIG 56 08/07/2023    BUN 12.2 08/07/2023     08/07/2023    CO2 25 08/07/2023    Lab Results   Component Value Date    WBC 6.5 08/07/2023    HGB 13.9 08/07/2023    HCT 42.3 08/07/2023     (H) 08/07/2023     08/07/2023    Lab Results   Component Value Date    TSH 3.41 08/07/2023          Cardiac Problems and Cardiac Diagnostics     Most Recent Cardiac testing:  ECG Personal interpretation  10/13/2014  SB    Stress test 11/7/2014:   CONCLUSION:  Exercise stress nuclear study is negative for inducible myocardial  ischemia or infarction.       COMMENTS:  The patient demonstrates appropriate exercise capacity for age though  with a mild degree of diastolic hypertension observed during exercise.    Cardiac CT 10/22/2014:  CONCLUSIONS:  1.  Total Agatston score is 79 which does place the patient in the greater than 90th  percentile when compared to age and gender matched control group.  2.  Calcified plaque isolated to the proximal and mid left anterior descending with  overall moderate   increased risk of coronary vascular events in the next 10 years.         Assessment/Recommendations   Assessment:     Ms. Tali Kumar is a 58 year old female with a significant past history of preclinical CAD, HLD, who presents for routine follow up.     Preclinical CAD   - Elevated CAC in 2014, negative stress test   - Now with new atypical symptoms.  Will evaluate with a CCTA which will also help to define her plaque burden further define her risk.   - Cont rosuvastatin   - Reviewed previous lipid panels.  LDL never extremely elevated.  Will check LP(a) given family history.  She has no children but does have nieces and nephews.   - TTE to evaluate structure/function    Elevated BP without a diagnosis of HTN   - BP elevated today, she attributes to white coat HTN   - Will have her check BP at home and report back with a log if elevated.    RTC 12 months    The longitudinal plan of care for CAD was addressed during this visit. Due to the added complexity in care, I will continue to support Tali Kumar  in the subsequent management of this condition(s) and with the ongoing continuity of care of this condition(s).         Avel Tucker DO [facility]  Non-invasive Cardiologist  [facility]         Thank you for allowing me to participate in the care of your patient.      Sincerely,     Avel Tucker DO     [facility]  cc:   Cyndi Chew MD  [address]  [place] [address]

## 2024-04-02 NOTE — PATIENT INSTRUCTIONS
It was a pleasure meeting you today    In summary:    We will get a cardiac CT and echocardiogram to evaluate your chest pain.     Please call my nurse Alexandr Majano at [phone] if you have any questions or issues.    We will schedule a follow up visit in  12 months      Avel Tucker DO [facility]  Non-invasive Cardiologist  [facility]

## 2024-04-02 NOTE — PROGRESS NOTES
[facility]   [address]  [place] [address]   [email or web address]   OFFICE: [phone]     CARDIOLOGY CLINIC NOTE     Thank you, Cherry Donovan, for asking the [facility] team to see Ms. Tali Kumar to evaluate Follow Up          History of Present Illness   Ms. Tali Kumar is a 58 year old female with a significant past history of preclinical CAD, HLD, who presents for routine follow up.  The patient notes over the last week she has started to experience a pressure/tightness in her L upper chest.  This does not seem to have any relationship with exertion.  She denies any dyspnea or other associated symptoms.  She walks a lot throughout the day.  She is worried that this symptom may be coming from her CAD as she was previously asymptomatic.      Other than noted above, Ms. Kumar denies any chest pain/pressure/tightness, shortness of breath at rest or with exertion, light headedness/dizziness, pre-syncope, syncope, lower extremity swelling, palpitations, paroxysmal nocturnal dyspnea (PND), or orthopnea.       Medications  Allergies   Current Outpatient Medications   Medication Sig Dispense Refill    ASPIRIN NOT PRESCRIBED (INTENTIONAL) Please choose reason not prescribed from choices below.      COQ10, UBIQUINOL, ORAL [COQ10, UBIQUINOL, ORAL] Take by mouth.             KRILL OIL ORAL [KRILL OIL ORAL] Take by mouth.      Magnesium Citrate 100 MG CAPS       methocarbamol (ROBAXIN) 500 MG tablet Take 1 tablet (500 mg) by mouth 3 times daily as needed for muscle spasms 30 tablet 1    Multiple Vitamins-Minerals (VITAMIN D3 COMPLETE) TABS       polyethylene glycol (MIRALAX) 17 GM/Dose powder Take 1 packet by mouth every 24 hours take 1 Packet by oral route every day mixed with 8 oz. water, juice, soda, coffee or tea      rosuvastatin (CRESTOR) 20 MG tablet Take 1 tablet (20 mg) by mouth daily 90 tablet 3      No Known Allergies     Physical  Examination Review of Systems   Vitals: There were no vitals taken for this visit.  BMI= There is no height or weight on file to calculate BMI.  Wt Readings from Last 3 Encounters:   08/07/23 86.5 kg (190 lb 11.2 oz)   08/05/22 84.5 kg (186 lb 4.8 oz)   11/23/21 83.5 kg (184 lb)       General: pleasant female. No acute distress.   Neck: No JVD  Lungs: clear to auscultation  COR:  regular rate and rhythm, No murmurs, rubs, or gallops  Extrem: No edema        Please refer above for cardiac ROS details.       Past History     Family History:   Family History   Problem Relation Age of Onset    Colon Cancer Maternal Grandfather 65.00    Macular Degeneration Maternal Grandfather     Stomach Cancer Mother 59    Other Cancer Mother         Stomach    Coronary Artery Disease Father     Parkinsonism Father     Pancreatic Cancer Father 59    Other Cancer Father         Pancreatic    Cerebrovascular Disease Maternal Grandmother     Parkinsonism Paternal Grandfather     Coronary Artery Disease Paternal Uncle     ALS Paternal Aunt     Multiple Sclerosis Other 35    Parkinsonism Cousin     Breast Cancer Cousin     Coronary Artery Disease Paternal Grandmother     Heart Disease No family hx of         Social History:   Social History     Socioeconomic History    Marital status:      Spouse name: Not on file    Number of children: Not on file    Years of education: Not on file    Highest education level: Not on file   Occupational History    Not on file   Tobacco Use    Smoking status: Never     Passive exposure: Current    Smokeless tobacco: Never    Tobacco comments:      smokes   Vaping Use    Vaping Use: Never used   Substance and Sexual Activity    Alcohol use: Yes     Alcohol/week: 10.0 standard drinks of alcohol     Types: 5 Glasses of wine, 5 Cans of beer per week     Comment: 10/week     Drug use: No    Sexual activity: Yes     Partners: Male     Birth control/protection: Post-menopausal, Male Surgical      Comment:     Other Topics Concern    Parent/sibling w/ CABG, MI or angioplasty before 65F 55M? No   Social History Narrative    Not on file     Social Determinants of Health     Financial Resource Strain: Not on file   Food Insecurity: Not on file   Transportation Needs: Not on file   Physical Activity: Not on file   Stress: Not on file   Social Connections: Not on file   Interpersonal Safety: Not on file   Housing Stability: Not on file            Lab Results    Chemistry/lipid CBC Cardiac Enzymes/BNP/TSH/INR   Lab Results   Component Value Date    CHOL 147 08/07/2023    HDL 88 08/07/2023    TRIG 56 08/07/2023    BUN 12.2 08/07/2023     08/07/2023    CO2 25 08/07/2023    Lab Results   Component Value Date    WBC 6.5 08/07/2023    HGB 13.9 08/07/2023    HCT 42.3 08/07/2023     (H) 08/07/2023     08/07/2023    Lab Results   Component Value Date    TSH 3.41 08/07/2023          Cardiac Problems and Cardiac Diagnostics     Most Recent Cardiac testing:  ECG Personal interpretation  10/13/2014  SB    Stress test 11/7/2014:   CONCLUSION:  Exercise stress nuclear study is negative for inducible myocardial  ischemia or infarction.       COMMENTS:  The patient demonstrates appropriate exercise capacity for age though  with a mild degree of diastolic hypertension observed during exercise.    Cardiac CT 10/22/2014:  CONCLUSIONS:  1.  Total Agatston score is 79 which does place the patient in the greater than 90th  percentile when compared to age and gender matched control group.  2.  Calcified plaque isolated to the proximal and mid left anterior descending with  overall moderate   increased risk of coronary vascular events in the next 10 years.         Assessment/Recommendations   Assessment:    Ms. Tali Kumar is a 58 year old female with a significant past history of preclinical CAD, HLD, who presents for routine follow up.     Preclinical CAD   - Elevated CAC in 2014, negative stress test   -  Now with new atypical symptoms.  Will evaluate with a CCTA which will also help to define her plaque burden further define her risk.   - Cont rosuvastatin   - Reviewed previous lipid panels.  LDL never extremely elevated.  Will check LP(a) given family history.  She has no children but does have nieces and nephews.   - TTE to evaluate structure/function    Elevated BP without a diagnosis of HTN   - BP elevated today, she attributes to white coat HTN   - Will have her check BP at home and report back with a log if elevated.    RTC 12 months    The longitudinal plan of care for CAD was addressed during this visit. Due to the added complexity in care, I will continue to support Tali Kumar  in the subsequent management of this condition(s) and with the ongoing continuity of care of this condition(s).         Avel Tucker DO [facility]  Non-invasive Cardiologist  [facility]

## 2024-04-10 ENCOUNTER — HOSPITAL ENCOUNTER (OUTPATIENT)
Dept: CARDIOLOGY | Facility: CLINIC | Age: 59
Discharge: HOME | End: 2024-04-10
Attending: STUDENT IN AN ORGANIZED HEALTH CARE EDUCATION/TRAINING PROGRAM | Admitting: STUDENT IN AN ORGANIZED HEALTH CARE EDUCATION/TRAINING PROGRAM
Payer: COMMERCIAL

## 2024-04-10 DIAGNOSIS — R07.89 ATYPICAL CHEST PAIN: ICD-10-CM

## 2024-04-10 PROCEDURE — 93306 TTE W/DOPPLER COMPLETE: CPT

## 2024-04-10 PROCEDURE — 93306 TTE W/DOPPLER COMPLETE: CPT | Mod: 26 | Performed by: INTERNAL MEDICINE

## 2024-04-30 ENCOUNTER — TELEPHONE (OUTPATIENT)
Dept: CARDIOLOGY | Facility: CLINIC | Age: 59
End: 2024-04-30
Payer: COMMERCIAL

## 2024-05-02 ENCOUNTER — HOSPITAL ENCOUNTER (OUTPATIENT)
Dept: CT IMAGING | Facility: CLINIC | Age: 59
Discharge: HOME | End: 2024-05-02
Attending: STUDENT IN AN ORGANIZED HEALTH CARE EDUCATION/TRAINING PROGRAM | Admitting: STUDENT IN AN ORGANIZED HEALTH CARE EDUCATION/TRAINING PROGRAM
Payer: COMMERCIAL

## 2024-05-02 VITALS — DIASTOLIC BLOOD PRESSURE: 61 MMHG | SYSTOLIC BLOOD PRESSURE: 133 MMHG | HEART RATE: 55 BPM

## 2024-05-02 DIAGNOSIS — I25.10 CORONARY ARTERY DISEASE INVOLVING NATIVE CORONARY ARTERY OF NATIVE HEART, UNSPECIFIED WHETHER ANGINA PRESENT: ICD-10-CM

## 2024-05-02 DIAGNOSIS — R07.89 ATYPICAL CHEST PAIN: ICD-10-CM

## 2024-05-02 LAB
BSA FOR ECHO PROCEDURE: 1.97 M2
CCTA ASCENDING AORTA: 3
CCTA SINUS: 3.2
CREAT BLD-MCNC: 0.9 MG/DL (ref 0.6–1.1)
CV CALCIUM SCORE AGATSTON LM: 11
CV CALCIUM SCORING AGATSON LAD: 154
CV CALCIUM SCORING AGATSTON CX: 40
CV CALCIUM SCORING AGATSTON RCA: 0
CV CALCIUM SCORING AGATSTON TOTAL: 205
EGFRCR SERPLBLD CKD-EPI 2021: >60 ML/MIN/1.73M2

## 2024-05-02 PROCEDURE — 82565 ASSAY OF CREATININE: CPT

## 2024-05-02 PROCEDURE — 250N000013 HC RX MED GY IP 250 OP 250 PS 637: Performed by: STUDENT IN AN ORGANIZED HEALTH CARE EDUCATION/TRAINING PROGRAM

## 2024-05-02 PROCEDURE — 250N000011 HC RX IP 250 OP 636: Performed by: STUDENT IN AN ORGANIZED HEALTH CARE EDUCATION/TRAINING PROGRAM

## 2024-05-02 PROCEDURE — 75574 CT ANGIO HRT W/3D IMAGE: CPT

## 2024-05-02 PROCEDURE — 75574 CT ANGIO HRT W/3D IMAGE: CPT | Mod: 26 | Performed by: GENERAL ACUTE CARE HOSPITAL

## 2024-05-02 RX ORDER — DILTIAZEM HYDROCHLORIDE 5 MG/ML
5 INJECTION INTRAVENOUS
Status: DISCONTINUED | OUTPATIENT
Start: 2024-05-02 | End: 2024-05-03 | Stop reason: HOSPADM

## 2024-05-02 RX ORDER — IOPAMIDOL 755 MG/ML
100 INJECTION, SOLUTION INTRAVASCULAR ONCE
Status: COMPLETED | OUTPATIENT
Start: 2024-05-02 | End: 2024-05-02

## 2024-05-02 RX ORDER — DILTIAZEM HYDROCHLORIDE 5 MG/ML
10 INJECTION INTRAVENOUS
Status: DISCONTINUED | OUTPATIENT
Start: 2024-05-02 | End: 2024-05-03 | Stop reason: HOSPADM

## 2024-05-02 RX ORDER — METOPROLOL TARTRATE 1 MG/ML
5 INJECTION, SOLUTION INTRAVENOUS
Status: DISCONTINUED | OUTPATIENT
Start: 2024-05-02 | End: 2024-05-03 | Stop reason: HOSPADM

## 2024-05-02 RX ORDER — NITROGLYCERIN 0.4 MG/1
0.4 TABLET SUBLINGUAL ONCE
Status: COMPLETED | OUTPATIENT
Start: 2024-05-02 | End: 2024-05-02

## 2024-05-02 RX ADMIN — IOPAMIDOL 100 ML: 755 INJECTION, SOLUTION INTRAVENOUS at 09:02

## 2024-05-02 RX ADMIN — NITROGLYCERIN 0.4 MG: 0.4 TABLET SUBLINGUAL at 08:56

## 2024-05-03 ENCOUNTER — TRANSFERRED RECORDS (OUTPATIENT)
Dept: HEALTH INFORMATION MANAGEMENT | Facility: CLINIC | Age: 59
End: 2024-05-03
Payer: COMMERCIAL

## 2024-06-10 ENCOUNTER — TELEPHONE (OUTPATIENT)
Dept: FAMILY MEDICINE | Facility: CLINIC | Age: 59
End: 2024-06-10

## 2024-06-10 ENCOUNTER — OFFICE VISIT (OUTPATIENT)
Dept: FAMILY MEDICINE | Facility: CLINIC | Age: 59
End: 2024-06-10
Payer: COMMERCIAL

## 2024-06-10 VITALS
OXYGEN SATURATION: 100 % | BODY MASS INDEX: 30.06 KG/M2 | RESPIRATION RATE: 14 BRPM | HEIGHT: 67 IN | HEART RATE: 66 BPM | TEMPERATURE: 98.1 F | WEIGHT: 191.5 LBS | SYSTOLIC BLOOD PRESSURE: 134 MMHG | DIASTOLIC BLOOD PRESSURE: 84 MMHG

## 2024-06-10 DIAGNOSIS — Z80.0 FAMILY HISTORY OF PANCREATIC CANCER: ICD-10-CM

## 2024-06-10 DIAGNOSIS — R73.01 ELEVATED FASTING GLUCOSE: ICD-10-CM

## 2024-06-10 DIAGNOSIS — R10.12 LUQ ABDOMINAL PAIN: Primary | ICD-10-CM

## 2024-06-10 DIAGNOSIS — Z13.1 SCREENING FOR DIABETES MELLITUS: ICD-10-CM

## 2024-06-10 DIAGNOSIS — R07.89 ATYPICAL CHEST PAIN: ICD-10-CM

## 2024-06-10 DIAGNOSIS — I25.10 CORONARY ARTERY DISEASE INVOLVING NATIVE CORONARY ARTERY OF NATIVE HEART, UNSPECIFIED WHETHER ANGINA PRESENT: ICD-10-CM

## 2024-06-10 DIAGNOSIS — E78.5 HYPERLIPIDEMIA WITH TARGET LDL LESS THAN 70: ICD-10-CM

## 2024-06-10 LAB
ALBUMIN SERPL BCG-MCNC: 4.7 G/DL (ref 3.5–5.2)
ALP SERPL-CCNC: 81 U/L (ref 40–150)
ALT SERPL W P-5'-P-CCNC: 18 U/L (ref 0–50)
ANION GAP SERPL CALCULATED.3IONS-SCNC: 10 MMOL/L (ref 7–15)
APO A-I SERPL-MCNC: <6 MG/DL
AST SERPL W P-5'-P-CCNC: 32 U/L (ref 0–45)
BASOPHILS # BLD AUTO: 0 10E3/UL (ref 0–0.2)
BASOPHILS NFR BLD AUTO: 1 %
BILIRUB SERPL-MCNC: 0.6 MG/DL
BUN SERPL-MCNC: 9.8 MG/DL (ref 8–23)
CALCIUM SERPL-MCNC: 9.6 MG/DL (ref 8.6–10)
CHLORIDE SERPL-SCNC: 101 MMOL/L (ref 98–107)
CHOLEST SERPL-MCNC: 140 MG/DL
CREAT SERPL-MCNC: 0.78 MG/DL (ref 0.51–0.95)
DEPRECATED HCO3 PLAS-SCNC: 26 MMOL/L (ref 22–29)
EGFRCR SERPLBLD CKD-EPI 2021: 87 ML/MIN/1.73M2
EOSINOPHIL # BLD AUTO: 0.1 10E3/UL (ref 0–0.7)
EOSINOPHIL NFR BLD AUTO: 2 %
ERYTHROCYTE [DISTWIDTH] IN BLOOD BY AUTOMATED COUNT: 11.8 % (ref 10–15)
FASTING STATUS PATIENT QL REPORTED: YES
FASTING STATUS PATIENT QL REPORTED: YES
GLUCOSE SERPL-MCNC: 131 MG/DL (ref 70–99)
HBA1C MFR BLD: 5.5 % (ref 0–5.6)
HCT VFR BLD AUTO: 43.5 % (ref 35–47)
HDLC SERPL-MCNC: 78 MG/DL
HGB BLD-MCNC: 14.4 G/DL (ref 11.7–15.7)
IMM GRANULOCYTES # BLD: 0 10E3/UL
IMM GRANULOCYTES NFR BLD: 0 %
LDLC SERPL CALC-MCNC: 48 MG/DL
LIPASE SERPL-CCNC: 38 U/L (ref 13–60)
LYMPHOCYTES # BLD AUTO: 2.5 10E3/UL (ref 0.8–5.3)
LYMPHOCYTES NFR BLD AUTO: 42 %
MCH RBC QN AUTO: 33.3 PG (ref 26.5–33)
MCHC RBC AUTO-ENTMCNC: 33.1 G/DL (ref 31.5–36.5)
MCV RBC AUTO: 101 FL (ref 78–100)
MONOCYTES # BLD AUTO: 0.5 10E3/UL (ref 0–1.3)
MONOCYTES NFR BLD AUTO: 8 %
NEUTROPHILS # BLD AUTO: 2.8 10E3/UL (ref 1.6–8.3)
NEUTROPHILS NFR BLD AUTO: 48 %
NONHDLC SERPL-MCNC: 62 MG/DL
PLATELET # BLD AUTO: 167 10E3/UL (ref 150–450)
POTASSIUM SERPL-SCNC: 5.5 MMOL/L (ref 3.4–5.3)
PROT SERPL-MCNC: 7 G/DL (ref 6.4–8.3)
RBC # BLD AUTO: 4.33 10E6/UL (ref 3.8–5.2)
SODIUM SERPL-SCNC: 137 MMOL/L (ref 135–145)
TRIGL SERPL-MCNC: 69 MG/DL
WBC # BLD AUTO: 5.9 10E3/UL (ref 4–11)

## 2024-06-10 PROCEDURE — 99214 OFFICE O/P EST MOD 30 MIN: CPT | Performed by: NURSE PRACTITIONER

## 2024-06-10 PROCEDURE — 85025 COMPLETE CBC W/AUTO DIFF WBC: CPT | Performed by: NURSE PRACTITIONER

## 2024-06-10 PROCEDURE — 80061 LIPID PANEL: CPT | Performed by: NURSE PRACTITIONER

## 2024-06-10 PROCEDURE — 83690 ASSAY OF LIPASE: CPT | Performed by: NURSE PRACTITIONER

## 2024-06-10 PROCEDURE — 83695 ASSAY OF LIPOPROTEIN(A): CPT | Performed by: NURSE PRACTITIONER

## 2024-06-10 PROCEDURE — 80053 COMPREHEN METABOLIC PANEL: CPT | Performed by: NURSE PRACTITIONER

## 2024-06-10 PROCEDURE — 83036 HEMOGLOBIN GLYCOSYLATED A1C: CPT | Performed by: NURSE PRACTITIONER

## 2024-06-10 PROCEDURE — 36415 COLL VENOUS BLD VENIPUNCTURE: CPT | Performed by: NURSE PRACTITIONER

## 2024-06-10 NOTE — PROGRESS NOTES
"  Assessment & Plan     LUQ abdominal pain  Lab work overall normal with the exception of elevated glucose.  Recommend fasting glucose and fasting insulin to assess elevated glucose further to look for possible insulin resistance especially in the setting of a normal A1c.  CBC does show slightly large red blood cells but hemoglobin is normal.  They are not significantly large I do not think we need to do any further workup with this.    I think we should try to obtain a abdominal ultrasound to assess pancreas.  I did discuss with patient we may need to obtain CT scan but would like to see if we can see anything on ultrasound.    - Comprehensive metabolic panel; Future  - CBC with platelets and differential; Future  - US Abdomen Limited; Future  - Comprehensive metabolic panel  - CBC with platelets and differential    Family history of pancreatic cancer  Normal lipase.  - Lipase; Future  - Lipase    Screening for diabetes mellitus  Normal hemoglobin A1c.  Not in diabetic or prediabetic range  - Hemoglobin A1c; Future  - Hemoglobin A1c    Hyperlipidemia with target LDL less than 70  Cholesterol is good today.  LDL is 48.  Recommend patient continue the rosuvastatin.    - Lipid Profile (Chol, Trig, HDL, LDL calc); Future  - Lipid Profile (Chol, Trig, HDL, LDL calc)    Atypical chest pain  As per cardiology.  - Lipoprotein (a)    Coronary artery disease involving native coronary artery of native heart, unspecified whether angina present  Per cardiology  - Lipoprotein (a)    Elevated fasting glucose  Patient to schedule lab only appointment to assess.  - Glucose; Future  - Insulin level; Future          BMI  Estimated body mass index is 30.45 kg/m  as calculated from the following:    Height as of this encounter: 1.689 m (5' 6.5\").    Weight as of this encounter: 86.9 kg (191 lb 8 oz).             González Cesar is a 59 year old, presenting for the following health issues:  pain left side rib area        6/10/2024    " " 7:42 AM   Additional Questions   Roomed by AIXA OLIVIA   Accompanied by self     History of Present Illness       Reason for visit:  Tenderness under left breast top of rib cage  Symptom onset:  More than a month  Symptoms include:  Tenderness  Symptom intensity:  Mild  Symptom progression:  Staying the same  Had these symptoms before:  No  What makes it worse:  No    She eats 2-3 servings of fruits and vegetables daily.She consumes 0 sweetened beverage(s) daily.She exercises with enough effort to increase her heart rate 30 to 60 minutes per day.  She exercises with enough effort to increase her heart rate 5 days per week.   She is taking medications regularly.     Parents both passed away around this age from stomach cancer (mom) and pancreatic cancer (dad).    Has not noticed any lumps in breast.     No injury that patient is aware of.     Patient feels like there might be a bump where it is tender.    Patient doesn't have any issues with eating/drinking. Sometimes has constipation issues.    Had colonscopy in 2016-recommended repeat in 2026                    Objective    /84   Pulse 66   Temp 98.1  F (36.7  C)   Resp 14   Ht 1.689 m (5' 6.5\")   Wt 86.9 kg (191 lb 8 oz)   SpO2 100%   BMI 30.45 kg/m    Body mass index is 30.45 kg/m .  Physical Exam  Constitutional:       Appearance: Normal appearance.   Abdominal:      General: Abdomen is flat.      Palpations: Abdomen is soft.      Tenderness: There is abdominal tenderness in the left upper quadrant. There is no guarding.   Neurological:      General: No focal deficit present.      Mental Status: She is alert and oriented to person, place, and time.   Psychiatric:         Mood and Affect: Mood normal.         Behavior: Behavior normal.            Results for orders placed or performed in visit on 06/10/24 (from the past 24 hour(s))   Lipoprotein (a)   Result Value Ref Range    Lipoprotein (a) <6 <30 mg/dL   Comprehensive metabolic panel   Result Value " Ref Range    Sodium 137 135 - 145 mmol/L    Potassium 5.5 (H) 3.4 - 5.3 mmol/L    Carbon Dioxide (CO2) 26 22 - 29 mmol/L    Anion Gap 10 7 - 15 mmol/L    Urea Nitrogen 9.8 8.0 - 23.0 mg/dL    Creatinine 0.78 0.51 - 0.95 mg/dL    GFR Estimate 87 >60 mL/min/1.73m2    Calcium 9.6 8.6 - 10.0 mg/dL    Chloride 101 98 - 107 mmol/L    Glucose 131 (H) 70 - 99 mg/dL    Alkaline Phosphatase 81 40 - 150 U/L    AST 32 0 - 45 U/L    ALT 18 0 - 50 U/L    Protein Total 7.0 6.4 - 8.3 g/dL    Albumin 4.7 3.5 - 5.2 g/dL    Bilirubin Total 0.6 <=1.2 mg/dL    Patient Fasting > 8hrs? Yes    Hemoglobin A1c   Result Value Ref Range    Hemoglobin A1C 5.5 0.0 - 5.6 %   Lipase   Result Value Ref Range    Lipase 38 13 - 60 U/L   Lipid Profile (Chol, Trig, HDL, LDL calc)   Result Value Ref Range    Cholesterol 140 <200 mg/dL    Triglycerides 69 <150 mg/dL    Direct Measure HDL 78 >=50 mg/dL    LDL Cholesterol Calculated 48 <=100 mg/dL    Non HDL Cholesterol 62 <130 mg/dL    Patient Fasting > 8hrs? Yes     Narrative    Cholesterol  Desirable:  <200 mg/dL    Triglycerides  Normal:  Less than 150 mg/dL  Borderline High:  150-199 mg/dL  High:  200-499 mg/dL  Very High:  Greater than or equal to 500 mg/dL    Direct Measure HDL  Female:  Greater than or equal to 50 mg/dL   Male:  Greater than or equal to 40 mg/dL    LDL Cholesterol  Desirable:  <100mg/dL  Above Desirable:  100-129 mg/dL   Borderline High:  130-159 mg/dL   High:  160-189 mg/dL   Very High:  >= 190 mg/dL    Non HDL Cholesterol  Desirable:  130 mg/dL  Above Desirable:  130-159 mg/dL  Borderline High:  160-189 mg/dL  High:  190-219 mg/dL  Very High:  Greater than or equal to 220 mg/dL   CBC with platelets and differential    Narrative    The following orders were created for panel order CBC with platelets and differential.  Procedure                               Abnormality         Status                     ---------                               -----------         ------                      CBC with platelets and d...[[id number]]  Abnormal            Final result                 Please view results for these tests on the individual orders.   CBC with platelets and differential   Result Value Ref Range    WBC Count 5.9 4.0 - 11.0 10e3/uL    RBC Count 4.33 3.80 - 5.20 10e6/uL    Hemoglobin 14.4 11.7 - 15.7 g/dL    Hematocrit 43.5 35.0 - 47.0 %     (H) 78 - 100 fL    MCH 33.3 (H) 26.5 - 33.0 pg    MCHC 33.1 31.5 - 36.5 g/dL    RDW 11.8 10.0 - 15.0 %    Platelet Count 167 150 - 450 10e3/uL    % Neutrophils 48 %    % Lymphocytes 42 %    % Monocytes 8 %    % Eosinophils 2 %    % Basophils 1 %    % Immature Granulocytes 0 %    Absolute Neutrophils 2.8 1.6 - 8.3 10e3/uL    Absolute Lymphocytes 2.5 0.8 - 5.3 10e3/uL    Absolute Monocytes 0.5 0.0 - 1.3 10e3/uL    Absolute Eosinophils 0.1 0.0 - 0.7 10e3/uL    Absolute Basophils 0.0 0.0 - 0.2 10e3/uL    Absolute Immature Granulocytes 0.0 <=0.4 10e3/uL           Signed Electronically by: CURT PARRA CNP

## 2024-06-10 NOTE — TELEPHONE ENCOUNTER
Patient returning call in regards to lab results message:      Denies having any snacks in the middle of the night.   She states she wakes up warm, but she does not wake up sweating where she has to change the sheets. She is menopausal so she does feel warm maybe slightly sweaty under her breasts.

## 2024-06-11 ENCOUNTER — MYC MEDICAL ADVICE (OUTPATIENT)
Dept: CARDIOLOGY | Facility: CLINIC | Age: 59
End: 2024-06-11
Payer: COMMERCIAL

## 2024-06-11 DIAGNOSIS — I25.10 CORONARY ARTERY DISEASE INVOLVING NATIVE CORONARY ARTERY OF NATIVE HEART, UNSPECIFIED WHETHER ANGINA PRESENT: Primary | ICD-10-CM

## 2024-06-11 NOTE — TELEPHONE ENCOUNTER
Please let patient know I was looking for signs of why glucose may be elevated in the morning such as hypoglycemia overnight and body responding with increasing blood sugars or eating a snack that would raise it.    I still recommend that fasting labs I ordered to check insulin and glucose levels.

## 2024-06-12 RX ORDER — ATORVASTATIN CALCIUM 20 MG/1
20 TABLET, FILM COATED ORAL AT BEDTIME
Qty: 90 TABLET | Refills: 3 | Status: SHIPPED | OUTPATIENT
Start: 2024-06-12 | End: 2024-08-09

## 2024-06-12 NOTE — TELEPHONE ENCOUNTER
Generally statins all share similar side effect profiles although the likelihood that they happen can vary with each individual statin.  I've sent a new prescription for the atorvastatin 20mg which I think will still get us adequate cholesterol lowering but hopefully without changing the sugar levels.  We can have your primary continue to check the glucose levels after this change to see if there is any improvement.    Dr. Tucker

## 2024-06-13 ENCOUNTER — HOSPITAL ENCOUNTER (OUTPATIENT)
Dept: ULTRASOUND IMAGING | Facility: CLINIC | Age: 59
Discharge: HOME | End: 2024-06-13
Attending: NURSE PRACTITIONER | Admitting: NURSE PRACTITIONER
Payer: COMMERCIAL

## 2024-06-13 DIAGNOSIS — R10.12 LUQ ABDOMINAL PAIN: ICD-10-CM

## 2024-06-13 PROCEDURE — 76700 US EXAM ABDOM COMPLETE: CPT

## 2024-06-14 ENCOUNTER — LAB (OUTPATIENT)
Dept: LAB | Facility: CLINIC | Age: 59
End: 2024-06-14
Payer: COMMERCIAL

## 2024-06-14 DIAGNOSIS — R73.01 ELEVATED FASTING GLUCOSE: ICD-10-CM

## 2024-06-14 LAB
FASTING STATUS PATIENT QL REPORTED: YES
GLUCOSE SERPL-MCNC: 113 MG/DL (ref 70–99)
INSULIN SERPL-ACNC: 4.5 UU/ML (ref 2.6–24.9)

## 2024-06-14 PROCEDURE — 36415 COLL VENOUS BLD VENIPUNCTURE: CPT

## 2024-06-14 PROCEDURE — 82947 ASSAY GLUCOSE BLOOD QUANT: CPT

## 2024-06-14 PROCEDURE — 83525 ASSAY OF INSULIN: CPT

## 2024-06-18 DIAGNOSIS — R73.01 ELEVATED FASTING GLUCOSE: Primary | ICD-10-CM

## 2024-08-02 ENCOUNTER — MYC MEDICAL ADVICE (OUTPATIENT)
Dept: CARDIOLOGY | Facility: CLINIC | Age: 59
End: 2024-08-02
Payer: COMMERCIAL

## 2024-08-02 DIAGNOSIS — I25.10 CORONARY ARTERY DISEASE INVOLVING NATIVE CORONARY ARTERY OF NATIVE HEART, UNSPECIFIED WHETHER ANGINA PRESENT: ICD-10-CM

## 2024-08-09 RX ORDER — ATORVASTATIN CALCIUM 20 MG/1
20 TABLET, FILM COATED ORAL AT BEDTIME
Qty: 90 TABLET | Refills: 2 | Status: SHIPPED | OUTPATIENT
Start: 2024-08-09

## 2024-08-20 PROBLEM — K31.7 FUNDIC GLAND POLYPOSIS OF STOMACH: Status: ACTIVE | Noted: 2024-08-20

## 2024-08-20 PROBLEM — R14.0 ABDOMINAL DISTENSION, GASEOUS: Status: ACTIVE | Noted: 2019-04-29

## 2024-08-20 PROBLEM — R14.0 ABDOMINAL BLOATING: Status: ACTIVE | Noted: 2024-08-20

## 2024-08-20 PROBLEM — K31.7 POLYP OF DUODENUM: Status: ACTIVE | Noted: 2023-01-19

## 2024-08-20 PROBLEM — Z80.0 FH: STOMACH CANCER: Status: ACTIVE | Noted: 2024-08-20

## 2024-08-20 PROBLEM — C44.91 BASAL CELL CARCINOMA OF SKIN: Status: ACTIVE | Noted: 2020-11-16

## 2024-08-20 PROBLEM — R10.13 EPIGASTRIC PAIN: Status: ACTIVE | Noted: 2024-08-20

## 2024-08-20 PROBLEM — R20.0 NUMBNESS OF EXTREMITY: Status: ACTIVE | Noted: 2022-09-16

## 2024-08-20 PROBLEM — R20.2 TINGLING: Status: ACTIVE | Noted: 2022-08-26

## 2024-08-20 PROBLEM — M54.50 LOW BACK PAIN: Status: ACTIVE | Noted: 2022-07-08

## 2024-08-20 PROBLEM — E53.8 B12 DEFICIENCY: Status: ACTIVE | Noted: 2022-08-26

## 2024-08-20 PROBLEM — E55.9 VITAMIN D DEFICIENCY: Status: ACTIVE | Noted: 2020-11-16

## 2024-08-20 PROBLEM — L29.0 PRURITUS ANI: Status: ACTIVE | Noted: 2024-08-20

## 2024-08-20 PROBLEM — K59.09 CHRONIC CONSTIPATION: Status: ACTIVE | Noted: 2024-08-20

## 2024-08-20 PROBLEM — R42 VERTIGO: Status: RESOLVED | Noted: 2020-11-16 | Resolved: 2024-08-20

## 2024-08-20 PROBLEM — K82.9 DISORDER OF GALLBLADDER: Status: ACTIVE | Noted: 2024-08-20

## 2024-08-20 PROBLEM — Z78.9 ADVISED ABOUT MANAGEMENT OF WEIGHT: Status: ACTIVE | Noted: 2019-04-29

## 2024-08-20 PROBLEM — R93.5 ABNORMAL US (ULTRASOUND) OF ABDOMEN: Status: ACTIVE | Noted: 2024-08-20

## 2024-08-20 PROBLEM — R42 VERTIGO: Status: ACTIVE | Noted: 2020-11-16

## 2024-08-20 RX ORDER — ERYTHROMYCIN 5 MG/G
OINTMENT OPHTHALMIC
COMMUNITY
Start: 2024-07-09 | End: 2024-08-23

## 2024-08-21 ENCOUNTER — PATIENT OUTREACH (OUTPATIENT)
Dept: GASTROENTEROLOGY | Facility: CLINIC | Age: 59
End: 2024-08-21
Payer: COMMERCIAL

## 2024-08-22 SDOH — HEALTH STABILITY: PHYSICAL HEALTH: ON AVERAGE, HOW MANY DAYS PER WEEK DO YOU ENGAGE IN MODERATE TO STRENUOUS EXERCISE (LIKE A BRISK WALK)?: 5 DAYS

## 2024-08-22 SDOH — HEALTH STABILITY: PHYSICAL HEALTH: ON AVERAGE, HOW MANY MINUTES DO YOU ENGAGE IN EXERCISE AT THIS LEVEL?: 60 MIN

## 2024-08-22 ASSESSMENT — SOCIAL DETERMINANTS OF HEALTH (SDOH): HOW OFTEN DO YOU GET TOGETHER WITH FRIENDS OR RELATIVES?: ONCE A WEEK

## 2024-08-23 ENCOUNTER — OFFICE VISIT (OUTPATIENT)
Dept: FAMILY MEDICINE | Facility: CLINIC | Age: 59
End: 2024-08-23
Payer: COMMERCIAL

## 2024-08-23 VITALS
TEMPERATURE: 96.6 F | WEIGHT: 193 LBS | OXYGEN SATURATION: 100 % | SYSTOLIC BLOOD PRESSURE: 132 MMHG | HEART RATE: 55 BPM | RESPIRATION RATE: 15 BRPM | BODY MASS INDEX: 31.02 KG/M2 | HEIGHT: 66 IN | DIASTOLIC BLOOD PRESSURE: 78 MMHG

## 2024-08-23 DIAGNOSIS — R73.01 ELEVATED FASTING GLUCOSE: ICD-10-CM

## 2024-08-23 DIAGNOSIS — I25.10 CORONARY ARTERY DISEASE INVOLVING NATIVE CORONARY ARTERY OF NATIVE HEART WITHOUT ANGINA PECTORIS: ICD-10-CM

## 2024-08-23 DIAGNOSIS — Z13.1 DIABETES MELLITUS SCREENING: ICD-10-CM

## 2024-08-23 DIAGNOSIS — Z78.0 POSTMENOPAUSAL STATUS: ICD-10-CM

## 2024-08-23 DIAGNOSIS — Z00.00 ENCOUNTER FOR ROUTINE HISTORY AND PHYSICAL EXAM IN FEMALE: Primary | ICD-10-CM

## 2024-08-23 DIAGNOSIS — E78.5 HYPERLIPIDEMIA WITH TARGET LOW DENSITY LIPOPROTEIN (LDL) CHOLESTEROL LESS THAN 70 MG/DL: ICD-10-CM

## 2024-08-23 DIAGNOSIS — Z79.899 MEDICATION MANAGEMENT: ICD-10-CM

## 2024-08-23 DIAGNOSIS — E66.811 CLASS 1 OBESITY WITH SERIOUS COMORBIDITY AND BODY MASS INDEX (BMI) OF 31.0 TO 31.9 IN ADULT, UNSPECIFIED OBESITY TYPE: ICD-10-CM

## 2024-08-23 LAB
ALBUMIN SERPL BCG-MCNC: 4.4 G/DL (ref 3.5–5.2)
ALBUMIN UR-MCNC: NEGATIVE MG/DL
ALP SERPL-CCNC: 83 U/L (ref 40–150)
ALT SERPL W P-5'-P-CCNC: 19 U/L (ref 0–50)
ANION GAP SERPL CALCULATED.3IONS-SCNC: 12 MMOL/L (ref 7–15)
APPEARANCE UR: CLEAR
AST SERPL W P-5'-P-CCNC: 33 U/L (ref 0–45)
BILIRUB SERPL-MCNC: 0.5 MG/DL
BILIRUB UR QL STRIP: NEGATIVE
BUN SERPL-MCNC: 14.1 MG/DL (ref 8–23)
CALCIUM SERPL-MCNC: 9.1 MG/DL (ref 8.8–10.4)
CHLORIDE SERPL-SCNC: 99 MMOL/L (ref 98–107)
CHOLEST SERPL-MCNC: 153 MG/DL
COLOR UR AUTO: YELLOW
CREAT SERPL-MCNC: 0.8 MG/DL (ref 0.51–0.95)
EGFRCR SERPLBLD CKD-EPI 2021: 84 ML/MIN/1.73M2
ERYTHROCYTE [DISTWIDTH] IN BLOOD BY AUTOMATED COUNT: 12 % (ref 10–15)
FASTING STATUS PATIENT QL REPORTED: YES
GLUCOSE SERPL-MCNC: 107 MG/DL (ref 70–99)
GLUCOSE SERPL-MCNC: 107 MG/DL (ref 70–99)
GLUCOSE UR STRIP-MCNC: NEGATIVE MG/DL
HBA1C MFR BLD: 5.4 % (ref 0–5.6)
HCO3 SERPL-SCNC: 25 MMOL/L (ref 22–29)
HCT VFR BLD AUTO: 42.1 % (ref 35–47)
HDLC SERPL-MCNC: 76 MG/DL
HGB BLD-MCNC: 13.8 G/DL (ref 11.7–15.7)
HGB UR QL STRIP: NEGATIVE
KETONES UR STRIP-MCNC: NEGATIVE MG/DL
LDLC SERPL CALC-MCNC: 66 MG/DL
LEUKOCYTE ESTERASE UR QL STRIP: NEGATIVE
MCH RBC QN AUTO: 32.9 PG (ref 26.5–33)
MCHC RBC AUTO-ENTMCNC: 32.8 G/DL (ref 31.5–36.5)
MCV RBC AUTO: 100 FL (ref 78–100)
NITRATE UR QL: NEGATIVE
NONHDLC SERPL-MCNC: 77 MG/DL
PH UR STRIP: 7 [PH] (ref 5–8)
PLATELET # BLD AUTO: 165 10E3/UL (ref 150–450)
POTASSIUM SERPL-SCNC: 4.7 MMOL/L (ref 3.4–5.3)
PROT SERPL-MCNC: 6.7 G/DL (ref 6.4–8.3)
RBC # BLD AUTO: 4.2 10E6/UL (ref 3.8–5.2)
SODIUM SERPL-SCNC: 136 MMOL/L (ref 135–145)
SP GR UR STRIP: 1.01 (ref 1–1.03)
TRIGL SERPL-MCNC: 54 MG/DL
TSH SERPL DL<=0.005 MIU/L-ACNC: 2.21 UIU/ML (ref 0.3–4.2)
UROBILINOGEN UR STRIP-ACNC: 0.2 E.U./DL
WBC # BLD AUTO: 7.9 10E3/UL (ref 4–11)

## 2024-08-23 PROCEDURE — 84443 ASSAY THYROID STIM HORMONE: CPT | Performed by: NURSE PRACTITIONER

## 2024-08-23 PROCEDURE — 81003 URINALYSIS AUTO W/O SCOPE: CPT | Performed by: NURSE PRACTITIONER

## 2024-08-23 PROCEDURE — 80053 COMPREHEN METABOLIC PANEL: CPT | Performed by: NURSE PRACTITIONER

## 2024-08-23 PROCEDURE — 36415 COLL VENOUS BLD VENIPUNCTURE: CPT | Performed by: NURSE PRACTITIONER

## 2024-08-23 PROCEDURE — 80061 LIPID PANEL: CPT | Performed by: NURSE PRACTITIONER

## 2024-08-23 PROCEDURE — 85027 COMPLETE CBC AUTOMATED: CPT | Performed by: NURSE PRACTITIONER

## 2024-08-23 PROCEDURE — 83036 HEMOGLOBIN GLYCOSYLATED A1C: CPT | Performed by: NURSE PRACTITIONER

## 2024-08-23 PROCEDURE — 99396 PREV VISIT EST AGE 40-64: CPT | Performed by: NURSE PRACTITIONER

## 2024-08-23 RX ORDER — CHOLECALCIFEROL (VITAMIN D3) 50 MCG
1 TABLET ORAL DAILY
COMMUNITY

## 2024-08-23 RX ORDER — METHOCARBAMOL 500 MG/1
TABLET, FILM COATED ORAL
COMMUNITY
Start: 2023-08-07 | End: 2024-08-23

## 2024-08-23 RX ORDER — ROSUVASTATIN CALCIUM 20 MG/1
TABLET, COATED ORAL
COMMUNITY
End: 2024-08-23

## 2024-08-23 ASSESSMENT — PAIN SCALES - GENERAL: PAINLEVEL: NO PAIN (0)

## 2024-08-23 NOTE — PROGRESS NOTES
Assessment and Plan:    Encounter for routine history and physical exam in female  Recommend consuming a healthy diet and exercising.  She declines pneumococcal vaccine.  She is up-to-date on breast cancer, cervical cancer, and colorectal cancer screening.  - CBC with platelets  - TSH with free T4 reflex  - UA Macroscopic with reflex to Microscopic and Culture  - UA Macroscopic with reflex to Microscopic and Culture  - CBC with platelets  - TSH with free T4 reflex    Diabetes mellitus screening  - Hemoglobin A1c  - Hemoglobin A1c    Postmenopausal status  Recommend adequate calcium and vitamin D intake.  - DX Bone Density    Hyperlipidemia with target low density lipoprotein (LDL) cholesterol less than 70 mg/dL  Will check lipid cascade.  Goal LDL is less than 70.  She continues atorvastatin.  - Lipid panel reflex to direct LDL Fasting  - Lipid panel reflex to direct LDL Fasting    Coronary artery disease involving native coronary artery of native heart without angina pectoris  She continues atorvastatin.  Recommend daily baby aspirin.  She is followed by cardiology.    Elevated fasting glucose  Will check glucose and A1c.  - Glucose    Class 1 obesity with serious comorbidity and body mass index (BMI) of 31.0 to 31.9 in adult, unspecified obesity type  Recommend consuming a healthy diet and exercising.  This is contributing to hyperlipidemia, CAD.    Medication management  - Comprehensive metabolic panel  - Comprehensive metabolic panel      Subjective:     Tali is a 59 year old female presenting to the clinic for a female physical.     LMP: over 4 years ago, no bleeding since    Hx of abnormal pap smear: none   Last pap smear: 8/7/23 normal, negative HPV   Perform self-breast exams: occasionally   Vaginal discharge or irritation: none   Sexually active: yes,  for 24 years   Contraception: none   Concerns for STDs: none   Previous pregnancies:none         Patient has a history of CAD and an elevated cardiac  calcium score.  She is taking Atorvastatin 20 mg daily.   She believes rosuvastatin was causing elevated blood sugars.  She has been tolerating the medication well.  She is trying to consume healthy diet.  She denies headaches, blurry vision, chest pain, shortness of breath with exertion, edema, orthopnea, syncope.    Review of systems:  I performed a 10 point review of systems.  All pertinent positives and negatives are noted in the HPI. All others are negative.     No Known Allergies    Current Outpatient Medications   Medication Sig Dispense Refill    atorvastatin (LIPITOR) 20 MG tablet Take 1 tablet (20 mg) by mouth at bedtime 90 tablet 2    co-enzyme Q-10 100 MG CAPS capsule Take 100 mg by mouth daily      cyanocobalamin (VITAMIN B-12) 1000 MCG tablet Take 1,000 mcg by mouth daily      KRILL OIL ORAL Take 500 mg by mouth daily      Magnesium Citrate 100 MG CAPS 250 mg      polyethylene glycol (MIRALAX) 17 GM/Dose powder Take 1 packet by mouth as needed take 1 Packet by oral route every day mixed with 8 oz. water, juice, soda, coffee or tea      vitamin D3 (CHOLECALCIFEROL) 50 mcg (2000 units) tablet Take 1 tablet by mouth daily.       No current facility-administered medications for this visit.       Social History     Socioeconomic History    Marital status:      Spouse name: Not on file    Number of children: Not on file    Years of education: Not on file    Highest education level: Not on file   Occupational History    Not on file   Tobacco Use    Smoking status: Never     Passive exposure: Past    Smokeless tobacco: Never    Tobacco comments:      smokes   Vaping Use    Vaping status: Never Used   Substance and Sexual Activity    Alcohol use: Yes     Alcohol/week: 10.0 standard drinks of alcohol     Types: 5 Glasses of wine, 5 Cans of beer per week     Comment: 10/week     Drug use: No    Sexual activity: Yes     Partners: Male     Birth control/protection: Post-menopausal, Male Surgical      Comment:     Other Topics Concern    Parent/sibling w/ CABG, MI or angioplasty before 65F 55M? No   Social History Narrative    Not on file     Social Determinants of Health     Financial Resource Strain: Low Risk  (8/22/2024)    Financial Resource Strain     Within the past 12 months, have you or your family members you live with been unable to get utilities (heat, electricity) when it was really needed?: No   Food Insecurity: Low Risk  (8/22/2024)    Food Insecurity     Within the past 12 months, did you worry that your food would run out before you got money to buy more?: No     Within the past 12 months, did the food you bought just not last and you didn t have money to get more?: No   Transportation Needs: Low Risk  (8/22/2024)    Transportation Needs     Within the past 12 months, has lack of transportation kept you from medical appointments, getting your medicines, non-medical meetings or appointments, work, or from getting things that you need?: No   Physical Activity: Sufficiently Active (8/22/2024)    Exercise Vital Sign     Days of Exercise per Week: 5 days     Minutes of Exercise per Session: 60 min   Stress: No Stress Concern Present (8/22/2024)    [place] [employer] - Occupational Stress Questionnaire     Feeling of Stress : Only a little   Social Connections: Unknown (8/22/2024)    Social Connection and Isolation Panel [NHANES]     Frequency of Communication with Friends and Family: Not on file     Frequency of Social Gatherings with Friends and Family: Once a week     Attends [employer] Services: Not on file     Active Member of Clubs or Organizations: Not on file     Attends Club or Organization Meetings: Not on file     Marital Status: Not on file   Interpersonal Safety: Low Risk  (8/23/2024)    Interpersonal Safety     Do you feel physically and emotionally safe where you currently live?: Yes     Within the past 12 months, have you been hit, slapped, kicked or otherwise  "physically hurt by someone?: No     Within the past 12 months, have you been humiliated or emotionally abused in other ways by your partner or ex-partner?: No   Housing Stability: Low Risk  (8/22/2024)    Housing Stability     Do you have housing? : Yes     Are you worried about losing your housing?: No       Past Medical History:   Diagnosis Date    Coronary artery calcification seen on CT scan     Hyperlipidemia     Overweight (BMI 25.0-29.9)     Vertigo 11/16/2020    Created by Conversion  Formatting of this note might be different from the original.   Created by Conversion         Family History   Problem Relation Age of Onset    Stomach Cancer Mother 59    Other Cancer Mother         Stomach    Coronary Artery Disease Father     Parkinsonism Father     Pancreatic Cancer Father 59    Other Cancer Father         Pancreatic    Kidney Disease Sister     Cerebrovascular Disease Maternal Grandmother     Colon Cancer Maternal Grandfather 65    Macular Degeneration Maternal Grandfather     Coronary Artery Disease Paternal Grandmother     Parkinsonism Paternal Grandfather     ALS Paternal Aunt     Coronary Artery Disease Paternal Uncle     Parkinsonism Cousin     Breast Cancer Cousin     Multiple Sclerosis Other 35    Heart Disease No family hx of        Past Surgical History:   Procedure Laterality Date    BLADDER NECK RECONSTRUCTION      BUNIONECTOMY Left     CT PROBE NASOLAC DUCT,INSERT TUBE/STENT      Description: Probing Of Nasolacrimal Duct With Insertion Of Tube;  Recorded: 05/21/2012;       Objective:     /78   Pulse 55   Temp (!) 96.6  F (35.9  C)   Resp 15   Ht 1.676 m (5' 6\")   Wt 87.5 kg (193 lb)   SpO2 100%   Breastfeeding No   BMI 31.15 kg/m      Patient is alert, no obvious distress.   Skin: Warm, dry.  No rashes or lesions. Skin turgor rapid return.   HEENT:  Eyes normal.  Ears normal.  Nose patent, mucosa pink.  Oropharynx mucosa pink, no lesions or tonsil enlargement.   Neck:  Supple, " without lymphadenopathy, bruits, JVD. Thyroid normal texture and size.    Lungs:  Clear to auscultation.  No wheezing, rales noted.  Respirations even and unlabored.   Heart:  Regular rate and rhythm.  No murmurs.   Breasts:  Normal.  No surrounding adenopathy.   Abdomen: Soft, nontender.  No organomegaly.  Bowel sounds normoactive.  No guarding or masses noted.   :  deferred  Musculoskeletal:  Full ROM of extremities.  Muscle strength equal +5/5.   Neurological:  Cranial nerves 2-12 intact.

## 2024-12-16 ENCOUNTER — HOSPITAL ENCOUNTER (OUTPATIENT)
Dept: MAMMOGRAPHY | Facility: CLINIC | Age: 59
Discharge: HOME | End: 2024-12-16
Attending: NURSE PRACTITIONER | Admitting: NURSE PRACTITIONER
Payer: COMMERCIAL

## 2024-12-16 DIAGNOSIS — Z12.31 VISIT FOR SCREENING MAMMOGRAM: ICD-10-CM

## 2024-12-16 PROCEDURE — 77063 BREAST TOMOSYNTHESIS BI: CPT

## 2024-12-16 PROCEDURE — 77067 SCR MAMMO BI INCL CAD: CPT

## 2024-12-19 ENCOUNTER — TRANSFERRED RECORDS (OUTPATIENT)
Dept: HEALTH INFORMATION MANAGEMENT | Facility: CLINIC | Age: 59
End: 2024-12-19
Payer: COMMERCIAL

## 2025-03-26 DIAGNOSIS — I25.10 CORONARY ARTERY DISEASE INVOLVING NATIVE CORONARY ARTERY OF NATIVE HEART, UNSPECIFIED WHETHER ANGINA PRESENT: ICD-10-CM

## 2025-03-26 RX ORDER — ATORVASTATIN CALCIUM 20 MG/1
20 TABLET, FILM COATED ORAL AT BEDTIME
Qty: 90 TABLET | Refills: 0 | Status: SHIPPED | OUTPATIENT
Start: 2025-03-26

## 2025-04-24 NOTE — PROGRESS NOTES
HEART CARE ENCOUNTER NOTE      [facility]  [phone]    Primary Care: Cherry Mccauley  Primary Cardiologist: Dr. Tucker    Assessment/Recommendations   Assessment:  Coronary artery calcifications  Via CAC score in 2014  Coronary CTA with nonobstructive CAD, elevated CAC  Asymptomatic  On statin  HLD  Initially on Crestor switched to Lipitor due to elevated blood glucose  LDL controlled at 66 on atorvastatin 20 mg      Plan:  Updated cholesterol panel and CMP to be done at PCP visit in August  Otherwise continue atorvastatin  Notify the office of chest pain or shortness of breath    Follow up with Dr. Tucker in 1 year or sooner if needed.      The longitudinal plan of care for the diagnosis(es)/condition(s) as documented were addressed during this visit. Due to the added complexity in care, I will continue to support Tali in the subsequent management and with ongoing continuity of care.      History of Present Illness/Subjective     Tali Kumar is a 59 year old female with PMHx of coronary artery calcifications, hyperlipidemia presenting for follow-up.      She was last seen by Dr. Tucker on 4/2/2024.  At that time, patient was endorsing typical chest pain and recommended coronary CTA and echo.  She also had extremely elevated LDL and was recommended an LPA.  LPA was normal  CCTA showed significant elevated calcium score but nonobstructive disease primarily from calcific plaque with mild burden of atherosclerosis  Echo was normal    She has been feeling well since last year and has no cardiac complaints.  She was initially started on Crestor but had elevated blood sugar readings with normal A1c she was switched to atorvastatin instead and had improvement in her glucose levels.  Has some mild myalgias with this but says they are tolerable.    She denies fatigue, lightheadedness, shortness of breath, dyspnea on exertion, orthopnea, PND, palpitations, chest pain, abdominal fullness/bloating, and lower  extremity edema.     Cardiac Testing       Echo:    TTE, 4/10/2024  1. Normal left ventricular size and systolic performance with a visually  estimated ejection fraction of 55%.  2. No significant valvular heart disease is identified on this study.  3. Normal right ventricular size and systolic performance.      Coronary CTA, 5/2/2024    The total Agatston score is 205. A calcium score in this range places the individual in the 95th percentile when compared to an age and gender matched control group.    Nonobstructive coronary artery disease comprised of primarily calcific plaque with an overall mild burden of atherosclerosis.    Radiology review for incidental non cardiac findings will be under separate report by the radiologist.    Labs:  LDL 66  Potassium 4.7  Creatinine 0.8  Hemoglobin 13.8  Platelet 165  A1c 5.4      Physical Examination    Vitals: /86 (BP Location: Right arm, Patient Position: Sitting, Cuff Size: Adult Large)   Pulse 76   Resp 16   Wt 85.3 kg (188 lb 1.6 oz)   SpO2 100%   BMI 30.36 kg/m      General: Well appearing, in no acute distress. Resting comfortably in exam chair  Skin: No clubbing, no cyanosis.  Eyes: Extra ocular movements intact  Neck: No jugular venous distention, no carotid bruits, carotids have a normal upstroke  Lungs: Clear to auscultation bilaterally, no wheezing or rhonchi.  Heart: Regular rhythm, PMI not displaced, S1, S2 normal, no S3, no S4, no heaves, no rub and no murmur.  Abdomen: Soft, nontender  Extremities: No peripheral edema . Grade 2/4 distal pulses bilaterally.  Neuro: Oriented to person, place and time, alert, cooperative, gait coordinated.    BP Readings from Last 3 Encounters:   05/01/25 128/86   08/23/24 132/78   06/10/24 134/84       Pulse Readings from Last 3 Encounters:   05/01/25 76   08/23/24 55   06/10/24 66       Wt Readings from Last 3 Encounters:   05/01/25 85.3 kg (188 lb 1.6 oz)   08/23/24 87.5 kg (193 lb)   06/10/24 86.9 kg (191 lb 8  oz)         Review of Systems      Please refer above for cardiac ROS details.       History     MEDICAL HISTORY:  Past Medical History:   Diagnosis Date    Coronary artery calcification seen on CT scan     Hyperlipidemia     Overweight (BMI 25.0-29.9)     Vertigo 11/16/2020    Created by Conversion  Formatting of this note might be different from the original.   Created by Conversion       SURGICAL HISTORY  Past Surgical History:   Procedure Laterality Date    BLADDER NECK RECONSTRUCTION      BUNIONECTOMY Left     AK PROBE NASOLAC DUCT,INSERT TUBE/STENT      Description: Probing Of Nasolacrimal Duct With Insertion Of Tube;  Recorded: 05/21/2012;      FAMILY HISTORY:  Family History   Problem Relation Age of Onset    Stomach Cancer Mother 59    Other Cancer Mother         Stomach    Coronary Artery Disease Father     Parkinsonism Father     Pancreatic Cancer Father 59    Other Cancer Father         Pancreatic    Kidney Disease Sister     Cerebrovascular Disease Maternal Grandmother     Colon Cancer Maternal Grandfather 65    Macular Degeneration Maternal Grandfather     Coronary Artery Disease Paternal Grandmother     Parkinsonism Paternal Grandfather     ALS Paternal Aunt     Coronary Artery Disease Paternal Uncle     Parkinsonism Cousin     Breast Cancer Cousin     Multiple Sclerosis Other 35    Heart Disease No family hx of     FAMILY HISTORY:  Family History   Problem Relation Age of Onset    Stomach Cancer Mother 59    Other Cancer Mother         Stomach    Coronary Artery Disease Father     Parkinsonism Father     Pancreatic Cancer Father 59    Other Cancer Father         Pancreatic    Kidney Disease Sister     Cerebrovascular Disease Maternal Grandmother     Colon Cancer Maternal Grandfather 65    Macular Degeneration Maternal Grandfather     Coronary Artery Disease Paternal Grandmother     Parkinsonism Paternal Grandfather     ALS Paternal Aunt     Coronary Artery Disease Paternal Uncle     Parkinsonism  Cousin     Breast Cancer Cousin     Multiple Sclerosis Other 35    Heart Disease No family hx of       SOCIAL HISTORY:  Social History     Socioeconomic History    Marital status:      Spouse name: Not on file    Number of children: Not on file    Years of education: Not on file    Highest education level: Not on file   Occupational History    Not on file   Tobacco Use    Smoking status: Never     Passive exposure: Past    Smokeless tobacco: Never    Tobacco comments:      smokes   Vaping Use    Vaping status: Never Used   Substance and Sexual Activity    Alcohol use: Yes     Alcohol/week: 10.0 standard drinks of alcohol     Types: 5 Glasses of wine, 5 Cans of beer per week     Comment: 10/week     Drug use: No    Sexual activity: Yes     Partners: Male     Birth control/protection: Post-menopausal, Male Surgical     Comment:     Other Topics Concern    Parent/sibling w/ CABG, MI or angioplasty before 65F 55M? No   Social History Narrative    Not on file     Social Drivers of Health     Financial Resource Strain: Low Risk  (8/22/2024)    Financial Resource Strain     Within the past 12 months, have you or your family members you live with been unable to get utilities (heat, electricity) when it was really needed?: No   Food Insecurity: Low Risk  (8/22/2024)    Food Insecurity     Within the past 12 months, did you worry that your food would run out before you got money to buy more?: No     Within the past 12 months, did the food you bought just not last and you didn t have money to get more?: No   Transportation Needs: Low Risk  (8/22/2024)    Transportation Needs     Within the past 12 months, has lack of transportation kept you from medical appointments, getting your medicines, non-medical meetings or appointments, work, or from getting things that you need?: No   Physical Activity: Sufficiently Active (8/22/2024)    Exercise Vital Sign     Days of Exercise per Week: 5 days     Minutes of  Exercise per Session: 60 min   Stress: No Stress Concern Present (8/22/2024)    [employer] - Occupational Stress Questionnaire     Feeling of Stress : Only a little   Social Connections: Unknown (8/22/2024)    Social Connection and Isolation Panel [NHANES]     Frequency of Communication with Friends and Family: Not on file     Frequency of Social Gatherings with Friends and Family: Once a week     Attends Jew Services: Not on file     Active Member of Clubs or Organizations: Not on file     Attends Club or Organization Meetings: Not on file     Marital Status: Not on file   Interpersonal Safety: Low Risk  (8/23/2024)    Interpersonal Safety     Do you feel physically and emotionally safe where you currently live?: Yes     Within the past 12 months, have you been hit, slapped, kicked or otherwise physically hurt by someone?: No     Within the past 12 months, have you been humiliated or emotionally abused in other ways by your partner or ex-partner?: No   Housing Stability: Low Risk  (8/22/2024)    Housing Stability     Do you have housing? : Yes     Are you worried about losing your housing?: No    ALLERGIES:  No Known Allergies         Medications:     Current Outpatient Medications   Medication Sig Dispense Refill    atorvastatin (LIPITOR) 20 MG tablet Take 1 tablet (20 mg) by mouth at bedtime. 90 tablet 0    co-enzyme Q-10 100 MG CAPS capsule Take 100 mg by mouth daily      cyanocobalamin (VITAMIN B-12) 1000 MCG tablet Take 1,000 mcg by mouth daily      KRILL OIL ORAL Take 500 mg by mouth daily      Magnesium Citrate 100 MG CAPS 250 mg      polyethylene glycol (MIRALAX) 17 GM/Dose powder Take 1 packet by mouth as needed take 1 Packet by oral route every day mixed with 8 oz. water, juice, soda, coffee or tea      vitamin D3 (CHOLECALCIFEROL) 50 mcg (2000 units) tablet Take 1 tablet by mouth daily.             López Ahn PA-C  May 1, 2025      This note was partially  generated using Dragon voice recognition system, and there may be some incorrect words,  spellings, and punctuation that were not noted in checking the note before saving.

## 2025-05-01 ENCOUNTER — OFFICE VISIT (OUTPATIENT)
Dept: CARDIOLOGY | Facility: CLINIC | Age: 60
End: 2025-05-01
Attending: STUDENT IN AN ORGANIZED HEALTH CARE EDUCATION/TRAINING PROGRAM
Payer: COMMERCIAL

## 2025-05-01 VITALS
DIASTOLIC BLOOD PRESSURE: 86 MMHG | OXYGEN SATURATION: 100 % | HEART RATE: 76 BPM | SYSTOLIC BLOOD PRESSURE: 128 MMHG | BODY MASS INDEX: 30.36 KG/M2 | RESPIRATION RATE: 16 BRPM | WEIGHT: 188.1 LBS

## 2025-05-01 DIAGNOSIS — I25.10 CORONARY ARTERY DISEASE INVOLVING NATIVE CORONARY ARTERY OF NATIVE HEART WITHOUT ANGINA PECTORIS: ICD-10-CM

## 2025-05-01 DIAGNOSIS — I10 PRIMARY HYPERTENSION: ICD-10-CM

## 2025-05-01 DIAGNOSIS — R07.89 ATYPICAL CHEST PAIN: ICD-10-CM

## 2025-05-01 DIAGNOSIS — E78.2 MIXED HYPERLIPIDEMIA: Primary | ICD-10-CM

## 2025-05-01 NOTE — PATIENT INSTRUCTIONS
It was great to see you today! Your care team includes myself and Dr. Tucker.    Recommendations:  Labwork with your PCP in August to check your kidney function, potassium levels, electrolytes, liver function, cholesterol,  No changes to your medications were made today.   Notify the office if you are having shortness of breath or chest pain.    Continue a heart healthy and low sodium diet (plant based or mediterranean diet).  Work on increasing physical activity with an end goal of 150 minutes per week of mild to moderate intensity exercise (brisk walk, biking, swimming)     Follow up with Dr. Tucker in 1 year or sooner if needed.      If you have questions, concerns, or new concerning symptoms prior to your next appointment, please contact the Cardiology Nursing team at [phone].    For scheduling issues/changes, please call [phone].

## 2025-05-01 NOTE — LETTER
5/1/2025    Cherry Mccauley, APRN CNP  [address]  [facility] [address]    RE: Tali Kumar       Dear Colleague,     I had the pleasure of [facility].  HEART CARE ENCOUNTER NOTE      [facility]  [phone]    Primary Care: Cherry Mccauley  Primary Cardiologist: Dr. Tucker    Assessment/Recommendations   Assessment:  Coronary artery calcifications  Via CAC score in 2014  Coronary CTA with nonobstructive CAD, elevated CAC  Asymptomatic  On statin  HLD  Initially on Crestor switched to Lipitor due to elevated blood glucose  LDL controlled at 66 on atorvastatin 20 mg      Plan:  Updated cholesterol panel and CMP to be done at PCP visit in August  Otherwise continue atorvastatin  Notify the office of chest pain or shortness of breath    Follow up with Dr. Tucker in 1 year or sooner if needed.      The longitudinal plan of care for the diagnosis(es)/condition(s) as documented were addressed during this visit. Due to the added complexity in care, I will continue to support Tali in the subsequent management and with ongoing continuity of care.      History of Present Illness/Subjective     Tali Kumar is a 59 year old female with PMHx of coronary artery calcifications, hyperlipidemia presenting for follow-up.      She was last seen by Dr. Tucker on 4/2/2024.  At that time, patient was endorsing typical chest pain and recommended coronary CTA and echo.  She also had extremely elevated LDL and was recommended an LPA.  LPA was normal  CCTA showed significant elevated calcium score but nonobstructive disease primarily from calcific plaque with mild burden of atherosclerosis  Echo was normal    She has been feeling well since last year and has no cardiac complaints.  She was initially started on Crestor but had elevated blood sugar readings with normal A1c she was switched to atorvastatin instead and had improvement in her glucose levels.  Has some mild  myalgias with this but says they are tolerable.    She denies fatigue, lightheadedness, shortness of breath, dyspnea on exertion, orthopnea, PND, palpitations, chest pain, abdominal fullness/bloating, and lower extremity edema.     Cardiac Testing       Echo:    TTE, 4/10/2024  1. Normal left ventricular size and systolic performance with a visually  estimated ejection fraction of 55%.  2. No significant valvular heart disease is identified on this study.  3. Normal right ventricular size and systolic performance.      Coronary CTA, 5/2/2024     The total Agatston score is 205. A calcium score in this range places the individual in the 95th percentile when compared to an age and gender matched control group.     Nonobstructive coronary artery disease comprised of primarily calcific plaque with an overall mild burden of atherosclerosis.     Radiology review for incidental non cardiac findings will be under separate report by the radiologist.    Labs:  LDL 66  Potassium 4.7  Creatinine 0.8  Hemoglobin 13.8  Platelet 165  A1c 5.4      Physical Examination    Vitals: /86 (BP Location: Right arm, Patient Position: Sitting, Cuff Size: Adult Large)   Pulse 76   Resp 16   Wt 85.3 kg (188 lb 1.6 oz)   SpO2 100%   BMI 30.36 kg/m      General: Well appearing, in no acute distress. Resting comfortably in exam chair  Skin: No clubbing, no cyanosis.  Eyes: Extra ocular movements intact  Neck: No jugular venous distention, no carotid bruits, carotids have a normal upstroke  Lungs: Clear to auscultation bilaterally, no wheezing or rhonchi.  Heart: Regular rhythm, PMI not displaced, S1, S2 normal, no S3, no S4, no heaves, no rub and no murmur.  Abdomen: Soft, nontender  Extremities: No peripheral edema . Grade 2/4 distal pulses bilaterally.  Neuro: Oriented to person, place and time, alert, cooperative, gait coordinated.    BP Readings from Last 3 Encounters:   05/01/25 128/86   08/23/24 132/78   06/10/24 134/84        Pulse Readings from Last 3 Encounters:   05/01/25 76   08/23/24 55   06/10/24 66       Wt Readings from Last 3 Encounters:   05/01/25 85.3 kg (188 lb 1.6 oz)   08/23/24 87.5 kg (193 lb)   06/10/24 86.9 kg (191 lb 8 oz)         Review of Systems      Please refer above for cardiac ROS details.       History     MEDICAL HISTORY:  Past Medical History:   Diagnosis Date     Coronary artery calcification seen on CT scan      Hyperlipidemia      Overweight (BMI 25.0-29.9)      Vertigo 11/16/2020    Created by Conversion  Formatting of this note might be different from the original.   Created by Conversion       SURGICAL HISTORY  Past Surgical History:   Procedure Laterality Date     BLADDER NECK RECONSTRUCTION       BUNIONECTOMY Left      NY PROBE NASOLAC DUCT,INSERT TUBE/STENT      Description: Probing Of Nasolacrimal Duct With Insertion Of Tube;  Recorded: 05/21/2012;      FAMILY HISTORY:  Family History   Problem Relation Age of Onset     Stomach Cancer Mother 59     Other Cancer Mother         Stomach     Coronary Artery Disease Father      Parkinsonism Father      Pancreatic Cancer Father 59     Other Cancer Father         Pancreatic     Kidney Disease Sister      Cerebrovascular Disease Maternal Grandmother      Colon Cancer Maternal Grandfather 65     Macular Degeneration Maternal Grandfather      Coronary Artery Disease Paternal Grandmother      Parkinsonism Paternal Grandfather      ALS Paternal Aunt      Coronary Artery Disease Paternal Uncle      Parkinsonism Cousin      Breast Cancer Cousin      Multiple Sclerosis Other 35     Heart Disease No family hx of     FAMILY HISTORY:  Family History   Problem Relation Age of Onset     Stomach Cancer Mother 59     Other Cancer Mother         Stomach     Coronary Artery Disease Father      Parkinsonism Father      Pancreatic Cancer Father 59     Other Cancer Father         Pancreatic     Kidney Disease Sister      Cerebrovascular Disease Maternal Grandmother       Colon Cancer Maternal Grandfather 65     Macular Degeneration Maternal Grandfather      Coronary Artery Disease Paternal Grandmother      Parkinsonism Paternal Grandfather      ALS Paternal Aunt      Coronary Artery Disease Paternal Uncle      Parkinsonism Cousin      Breast Cancer Cousin      Multiple Sclerosis Other 35     Heart Disease No family hx of       SOCIAL HISTORY:  Social History     Socioeconomic History     Marital status:      Spouse name: Not on file     Number of children: Not on file     Years of education: Not on file     Highest education level: Not on file   Occupational History     Not on file   Tobacco Use     Smoking status: Never     Passive exposure: Past     Smokeless tobacco: Never     Tobacco comments:      smokes   Vaping Use     Vaping status: Never Used   Substance and Sexual Activity     Alcohol use: Yes     Alcohol/week: 10.0 standard drinks of alcohol     Types: 5 Glasses of wine, 5 Cans of beer per week     Comment: 10/week      Drug use: No     Sexual activity: Yes     Partners: Male     Birth control/protection: Post-menopausal, Male Surgical     Comment:     Other Topics Concern     Parent/sibling w/ CABG, MI or angioplasty before 65F 55M? No   Social History Narrative     Not on file     Social Drivers of Health     Financial Resource Strain: Low Risk  (8/22/2024)    Financial Resource Strain      Within the past 12 months, have you or your family members you live with been unable to get utilities (heat, electricity) when it was really needed?: No   Food Insecurity: Low Risk  (8/22/2024)    Food Insecurity      Within the past 12 months, did you worry that your food would run out before you got money to buy more?: No      Within the past 12 months, did the food you bought just not last and you didn t have money to get more?: No   Transportation Needs: Low Risk  (8/22/2024)    Transportation Needs      Within the past 12 months, has lack of transportation  kept you from medical appointments, getting your medicines, non-medical meetings or appointments, work, or from getting things that you need?: No   Physical Activity: Sufficiently Active (8/22/2024)    Exercise Vital Sign      Days of Exercise per Week: 5 days      Minutes of Exercise per Session: 60 min   Stress: No Stress Concern Present (8/22/2024)    [employer] [place] [employer] - Occupational Stress Questionnaire      Feeling of Stress : Only a little   Social Connections: Unknown (8/22/2024)    Social Connection and Isolation Panel [NHANES]      Frequency of Communication with Friends and Family: Not on file      Frequency of Social Gatherings with Friends and Family: Once a week      Attends [employer] Services: Not on file      Active Member of Clubs or Organizations: Not on file      Attends Club or Organization Meetings: Not on file      Marital Status: Not on file   Interpersonal Safety: Low Risk  (8/23/2024)    Interpersonal Safety      Do you feel physically and emotionally safe where you currently live?: Yes      Within the past 12 months, have you been hit, slapped, kicked or otherwise physically hurt by someone?: No      Within the past 12 months, have you been humiliated or emotionally abused in other ways by your partner or ex-partner?: No   Housing Stability: Low Risk  (8/22/2024)    Housing Stability      Do you have housing? : Yes      Are you worried about losing your housing?: No    ALLERGIES:  No Known Allergies         Medications:     Current Outpatient Medications   Medication Sig Dispense Refill     atorvastatin (LIPITOR) 20 MG tablet Take 1 tablet (20 mg) by mouth at bedtime. 90 tablet 0     co-enzyme Q-10 100 MG CAPS capsule Take 100 mg by mouth daily       cyanocobalamin (VITAMIN B-12) 1000 MCG tablet Take 1,000 mcg by mouth daily       KRILL OIL ORAL Take 500 mg by mouth daily       Magnesium Citrate 100 MG CAPS 250 mg       polyethylene glycol (MIRALAX) 17 GM/Dose powder  Take 1 packet by mouth as needed take 1 Packet by oral route every day mixed with 8 oz. water, juice, soda, coffee or tea       vitamin D3 (CHOLECALCIFEROL) 50 mcg (2000 units) tablet Take 1 tablet by mouth daily.             López Ahn PA-C  May 1, 2025      This note was partially generated using Dragon voice recognition system, and there may be some incorrect words,  spellings, and punctuation that were not noted in checking the note before saving.      Thank you for allowing me to participate in the care of your patient.      Sincerely,     López Ahn PA-C     [facility]  cc:   Avel Tucker DO  [address]  [place] [address]

## 2025-05-09 ENCOUNTER — TRANSFERRED RECORDS (OUTPATIENT)
Dept: HEALTH INFORMATION MANAGEMENT | Facility: CLINIC | Age: 60
End: 2025-05-09
Payer: COMMERCIAL

## 2025-06-03 DIAGNOSIS — I25.10 CORONARY ARTERY DISEASE INVOLVING NATIVE CORONARY ARTERY OF NATIVE HEART, UNSPECIFIED WHETHER ANGINA PRESENT: ICD-10-CM

## 2025-06-03 RX ORDER — ATORVASTATIN CALCIUM 20 MG/1
20 TABLET, FILM COATED ORAL AT BEDTIME
Qty: 90 TABLET | Refills: 2 | Status: SHIPPED | OUTPATIENT
Start: 2025-06-03

## 2025-07-24 ENCOUNTER — PATIENT OUTREACH (OUTPATIENT)
Dept: CARE COORDINATION | Facility: CLINIC | Age: 60
End: 2025-07-24
Payer: COMMERCIAL